# Patient Record
Sex: MALE | Race: WHITE
[De-identification: names, ages, dates, MRNs, and addresses within clinical notes are randomized per-mention and may not be internally consistent; named-entity substitution may affect disease eponyms.]

---

## 2018-01-29 ENCOUNTER — HOSPITAL ENCOUNTER (OUTPATIENT)
Age: 58
End: 2018-01-29
Payer: COMMERCIAL

## 2018-01-29 DIAGNOSIS — M79.672: ICD-10-CM

## 2018-01-29 DIAGNOSIS — M76.62: Primary | ICD-10-CM

## 2018-01-29 PROCEDURE — 73610 X-RAY EXAM OF ANKLE: CPT

## 2018-01-29 PROCEDURE — 73630 X-RAY EXAM OF FOOT: CPT

## 2018-02-23 ENCOUNTER — HOSPITAL ENCOUNTER (OUTPATIENT)
Age: 58
End: 2018-02-23
Payer: COMMERCIAL

## 2018-02-23 DIAGNOSIS — Z00.00: Primary | ICD-10-CM

## 2018-03-21 ENCOUNTER — HOSPITAL ENCOUNTER (OUTPATIENT)
Dept: HOSPITAL 22 - PT | Age: 58
LOS: 19 days | Discharge: HOME | End: 2018-04-09
Payer: COMMERCIAL

## 2018-03-21 DIAGNOSIS — M79.672: Primary | ICD-10-CM

## 2018-03-21 DIAGNOSIS — M76.62: ICD-10-CM

## 2018-03-21 PROCEDURE — 97110 THERAPEUTIC EXERCISES: CPT

## 2018-03-21 PROCEDURE — 97033 APP MDLTY 1+IONTPHRSIS EA 15: CPT

## 2018-07-16 ENCOUNTER — HOSPITAL ENCOUNTER (OUTPATIENT)
Age: 58
End: 2018-07-16
Payer: COMMERCIAL

## 2018-07-16 DIAGNOSIS — M79.671: Primary | ICD-10-CM

## 2018-07-16 DIAGNOSIS — G57.61: ICD-10-CM

## 2018-07-16 PROCEDURE — 76882 US LMTD JT/FCL EVL NVASC XTR: CPT

## 2018-11-21 ENCOUNTER — HOSPITAL ENCOUNTER (OUTPATIENT)
Age: 58
End: 2018-11-21
Payer: COMMERCIAL

## 2018-11-21 DIAGNOSIS — I10: ICD-10-CM

## 2018-11-21 DIAGNOSIS — E78.1: ICD-10-CM

## 2018-11-21 DIAGNOSIS — E11.9: Primary | ICD-10-CM

## 2018-11-21 LAB
ALBUMIN LEVEL: 4 GM/DL (ref 3.4–5)
ALBUMIN/GLOB SERPL: 1.4 {RATIO} (ref 1.1–1.8)
ALP ISO SERPL-ACNC: 83 U/L (ref 46–116)
ALT SERPLBLD-CCNC: 46 U/L (ref 12–78)
ANION GAP SERPL CALC-SCNC: 14 MEQ/L (ref 5–15)
AST SERPL QL: 29 U/L (ref 15–37)
BILIRUBIN,TOTAL: 0.7 MG/DL (ref 0.2–1)
BUN SERPL-MCNC: 18 MG/DL (ref 7–18)
CALCIUM SPEC-MCNC: 8.7 MG/DL (ref 8.5–10.1)
CHLORIDE SPEC-SCNC: 105 MMOL/L (ref 98–107)
CHOLEST SPEC-SCNC: 127 MG/DL (ref 140–200)
CO2 SERPL-SCNC: 27 MMOL/L (ref 21–32)
CREAT BLD-SCNC: 1.38 MG/DL (ref 0.7–1.3)
ESTIMATED GLOMERULAR FILT RATE: 53 ML/MIN (ref 60–?)
GFR (AFRICAN AMERICAN): 64 ML/MIN (ref 60–?)
GLOBULIN SER CALC-MCNC: 2.9 GM/DL (ref 1.3–3.2)
GLUCOSE: 188 MG/DL (ref 74–106)
HBA1C MFR BLD: 6.9 % (ref 0–7)
HCT VFR BLD CALC: 46 % (ref 42–52)
HDLC SERPL-MCNC: 32 MG/DL (ref 27–67)
HGB BLD-MCNC: 15.7 G/DL (ref 14.1–18)
MCHC RBC-ENTMCNC: 34.2 G/DL (ref 31.8–35.4)
MCV RBC: 92.9 FL (ref 80–94)
MEAN CORPUSCULAR HEMOGLOBIN: 31.8 PG (ref 27–31.2)
PLATELET # BLD: 107 K/MM3 (ref 142–424)
POTASSIUM: 4 MMOL/L (ref 3.5–5.1)
PROT SERPL-MCNC: 6.9 GM/DL (ref 6.4–8.2)
RBC # BLD AUTO: 4.95 M/MM3 (ref 4.6–6.2)
SODIUM SPEC-SCNC: 142 MMOL/L (ref 136–145)
TRIGLYCERIDES: 149 MG/DL (ref 30–200)
WBC # BLD AUTO: 2.8 K/MM3 (ref 4.8–10.8)

## 2018-11-21 PROCEDURE — 85025 COMPLETE CBC W/AUTO DIFF WBC: CPT

## 2018-11-21 PROCEDURE — 80053 COMPREHEN METABOLIC PANEL: CPT

## 2018-11-21 PROCEDURE — 83036 HEMOGLOBIN GLYCOSYLATED A1C: CPT

## 2018-11-21 PROCEDURE — 36415 COLL VENOUS BLD VENIPUNCTURE: CPT

## 2018-11-21 PROCEDURE — 80061 LIPID PANEL: CPT

## 2018-12-07 ENCOUNTER — HOSPITAL ENCOUNTER (OUTPATIENT)
Age: 58
End: 2018-12-07
Payer: COMMERCIAL

## 2018-12-07 DIAGNOSIS — N17.9: Primary | ICD-10-CM

## 2018-12-07 DIAGNOSIS — D75.89: ICD-10-CM

## 2018-12-07 LAB
ANION GAP SERPL CALC-SCNC: 17.2 MEQ/L (ref 5–15)
BUN SERPL-MCNC: 14 MG/DL (ref 7–18)
CALCIUM SPEC-MCNC: 8.7 MG/DL (ref 8.5–10.1)
CHLORIDE SPEC-SCNC: 104 MMOL/L (ref 98–107)
CO2 SERPL-SCNC: 26 MMOL/L (ref 21–32)
COLOR UR: YELLOW
CREAT BLD-SCNC: 1.45 MG/DL (ref 0.7–1.3)
ESTIMATED GLOMERULAR FILT RATE: 50 ML/MIN (ref 60–?)
GFR (AFRICAN AMERICAN): 61 ML/MIN (ref 60–?)
GLUCOSE: 177 MG/DL (ref 74–106)
HCT VFR BLD CALC: 47 % (ref 42–52)
HGB BLD-MCNC: 16.3 G/DL (ref 14.1–18)
MCHC RBC-ENTMCNC: 34.6 G/DL (ref 31.8–35.4)
MCV RBC: 90.8 FL (ref 80–94)
MEAN CORPUSCULAR HEMOGLOBIN: 31.4 PG (ref 27–31.2)
MICRO URNS: (no result)
PH UR: 6 [PH] (ref 5–8.5)
PLATELET # BLD: 110 K/MM3 (ref 142–424)
POTASSIUM: 4.2 MMOL/L (ref 3.5–5.1)
RBC # BLD AUTO: 5.18 M/MM3 (ref 4.6–6.2)
SODIUM SPEC-SCNC: 143 MMOL/L (ref 136–145)
SP GR UR: 1.01 (ref 1–1.03)
UROBILINOGEN UR QL: 0.2 EU/DL
WBC # BLD AUTO: 4.3 K/MM3 (ref 4.8–10.8)

## 2018-12-07 PROCEDURE — 80048 BASIC METABOLIC PNL TOTAL CA: CPT

## 2018-12-07 PROCEDURE — 81001 URINALYSIS AUTO W/SCOPE: CPT

## 2018-12-07 PROCEDURE — 85025 COMPLETE CBC W/AUTO DIFF WBC: CPT

## 2018-12-07 PROCEDURE — 36415 COLL VENOUS BLD VENIPUNCTURE: CPT

## 2018-12-07 PROCEDURE — 87205 SMEAR GRAM STAIN: CPT

## 2018-12-07 PROCEDURE — 76770 US EXAM ABDO BACK WALL COMP: CPT

## 2018-12-20 ENCOUNTER — OFFICE VISIT (OUTPATIENT)
Dept: BARIATRICS/WEIGHT MGMT | Facility: CLINIC | Age: 58
End: 2018-12-20

## 2018-12-20 ENCOUNTER — DOCUMENTATION (OUTPATIENT)
Dept: BARIATRICS/WEIGHT MGMT | Facility: CLINIC | Age: 58
End: 2018-12-20

## 2018-12-20 VITALS
HEART RATE: 74 BPM | DIASTOLIC BLOOD PRESSURE: 82 MMHG | OXYGEN SATURATION: 99 % | TEMPERATURE: 97.8 F | BODY MASS INDEX: 41.75 KG/M2 | HEIGHT: 73 IN | WEIGHT: 315 LBS | RESPIRATION RATE: 18 BRPM | SYSTOLIC BLOOD PRESSURE: 128 MMHG

## 2018-12-20 DIAGNOSIS — R10.13 DYSPEPSIA: ICD-10-CM

## 2018-12-20 DIAGNOSIS — G47.33 OBSTRUCTIVE SLEEP APNEA SYNDROME: Primary | ICD-10-CM

## 2018-12-20 DIAGNOSIS — E11.69 DIABETES MELLITUS TYPE 2 IN OBESE (HCC): ICD-10-CM

## 2018-12-20 DIAGNOSIS — I10 ESSENTIAL HYPERTENSION: ICD-10-CM

## 2018-12-20 DIAGNOSIS — E66.9 DIABETES MELLITUS TYPE 2 IN OBESE (HCC): ICD-10-CM

## 2018-12-20 DIAGNOSIS — R53.83 FATIGUE, UNSPECIFIED TYPE: ICD-10-CM

## 2018-12-20 PROCEDURE — 99204 OFFICE O/P NEW MOD 45 MIN: CPT | Performed by: PHYSICIAN ASSISTANT

## 2018-12-20 RX ORDER — RIVAROXABAN 20 MG/1
20 TABLET, FILM COATED ORAL DAILY
COMMUNITY
Start: 2018-11-26

## 2018-12-20 RX ORDER — GLIMEPIRIDE 4 MG/1
TABLET ORAL
COMMUNITY
Start: 2018-12-03 | End: 2021-01-25

## 2018-12-20 RX ORDER — DOFETILIDE 0.25 MG/1
250 CAPSULE ORAL 2 TIMES DAILY
Refills: 6 | COMMUNITY
Start: 2018-12-11

## 2018-12-20 RX ORDER — FENOFIBRATE 145 MG/1
TABLET, COATED ORAL
COMMUNITY
Start: 2018-12-03 | End: 2022-03-24

## 2018-12-20 RX ORDER — METFORMIN HYDROCHLORIDE 500 MG/1
1500 TABLET, EXTENDED RELEASE ORAL
COMMUNITY
Start: 2018-12-19 | End: 2021-11-17 | Stop reason: SDUPTHER

## 2018-12-20 RX ORDER — DULAGLUTIDE 1.5 MG/.5ML
INJECTION, SOLUTION SUBCUTANEOUS
COMMUNITY
Start: 2018-11-29 | End: 2020-12-23 | Stop reason: SDUPTHER

## 2018-12-20 RX ORDER — PEN NEEDLE, DIABETIC 32GX 5/32"
NEEDLE, DISPOSABLE MISCELLANEOUS
Refills: 11 | COMMUNITY
Start: 2018-11-29 | End: 2022-05-18 | Stop reason: SDUPTHER

## 2018-12-20 RX ORDER — INFLUENZA A VIRUS A/SINGAPORE/GP1908/2015 IVR-180A (H1N1) ANTIGEN (PROPIOLACTONE INACTIVATED), INFLUENZA A VIRUS A/SINGAPORE/INFIMH-16-0019/2016 IVR-186 (H3N2) ANTIGEN (PROPIOLACTONE INACTIVATED), INFLUENZA B VIRUS B/MARYLAND/15/2016 ANTIGEN (PROPIOLACTONE INACTIVATED), AND INFLUENZA B VIRUS B/PHUKET/3073/2013 BVR-1B ANTIGEN (PROPIOLACTONE INACTIVATED) 15; 15; 15; 15 UG/.5ML; UG/.5ML; UG/.5ML; UG/.5ML
INJECTION, SUSPENSION INTRAMUSCULAR
Refills: 0 | COMMUNITY
Start: 2018-10-08 | End: 2018-12-20

## 2018-12-20 RX ORDER — FUROSEMIDE 40 MG/1
40 TABLET ORAL 3 TIMES WEEKLY
COMMUNITY
Start: 2018-12-19

## 2018-12-20 RX ORDER — POTASSIUM CHLORIDE 1500 MG/1
20 TABLET, EXTENDED RELEASE ORAL DAILY
COMMUNITY
Start: 2018-12-19

## 2018-12-20 RX ORDER — DOFETILIDE 0.12 MG/1
125 CAPSULE ORAL 2 TIMES DAILY
Refills: 3 | COMMUNITY
Start: 2018-11-29

## 2018-12-20 RX ORDER — METOPROLOL SUCCINATE 100 MG/1
50 TABLET, EXTENDED RELEASE ORAL 2 TIMES DAILY
Refills: 0 | COMMUNITY
Start: 2018-11-01

## 2018-12-20 RX ORDER — MAGNESIUM OXIDE 400 MG/1
1 TABLET ORAL DAILY
Refills: 9 | COMMUNITY
Start: 2018-11-24

## 2018-12-20 RX ORDER — INSULIN DEGLUDEC 200 U/ML
INJECTION, SOLUTION SUBCUTANEOUS
Refills: 2 | COMMUNITY
Start: 2018-12-11 | End: 2021-01-25

## 2018-12-20 RX ORDER — OMEPRAZOLE 40 MG/1
40 CAPSULE, DELAYED RELEASE ORAL DAILY
COMMUNITY

## 2019-01-02 ENCOUNTER — TELEPHONE (OUTPATIENT)
Dept: BARIATRICS/WEIGHT MGMT | Facility: CLINIC | Age: 59
End: 2019-01-02

## 2019-01-20 ENCOUNTER — RESULTS ENCOUNTER (OUTPATIENT)
Dept: BARIATRICS/WEIGHT MGMT | Facility: CLINIC | Age: 59
End: 2019-01-20

## 2019-01-20 DIAGNOSIS — R10.13 DYSPEPSIA: ICD-10-CM

## 2019-03-26 ENCOUNTER — HOSPITAL ENCOUNTER (OUTPATIENT)
Age: 59
End: 2019-03-26
Payer: COMMERCIAL

## 2019-03-26 DIAGNOSIS — D75.89: ICD-10-CM

## 2019-03-26 DIAGNOSIS — I48.2: ICD-10-CM

## 2019-03-26 DIAGNOSIS — R16.1: ICD-10-CM

## 2019-03-26 DIAGNOSIS — Z79.84: ICD-10-CM

## 2019-03-26 DIAGNOSIS — E78.1: ICD-10-CM

## 2019-03-26 DIAGNOSIS — K76.0: Primary | ICD-10-CM

## 2019-03-26 DIAGNOSIS — E11.69: ICD-10-CM

## 2019-03-26 LAB
ALBUMIN LEVEL: 4 GM/DL (ref 3.4–5)
ALBUMIN/GLOB SERPL: 1.3 {RATIO} (ref 1.1–1.8)
ALP ISO SERPL-ACNC: 105 U/L (ref 46–116)
ALT SERPLBLD-CCNC: 51 U/L (ref 12–78)
ANION GAP SERPL CALC-SCNC: 15.7 MEQ/L (ref 5–15)
AST SERPL QL: 35 U/L (ref 15–37)
BILIRUBIN,TOTAL: 0.5 MG/DL (ref 0.2–1)
BUN SERPL-MCNC: 21 MG/DL (ref 7–18)
CALCIUM SPEC-MCNC: 9 MG/DL (ref 8.5–10.1)
CHLORIDE SPEC-SCNC: 103 MMOL/L (ref 98–107)
CHOLEST SPEC-SCNC: 147 MG/DL (ref 140–200)
CO2 SERPL-SCNC: 26 MMOL/L (ref 21–32)
CREAT BLD-SCNC: 1.38 MG/DL (ref 0.7–1.3)
ESTIMATED GLOMERULAR FILT RATE: 53 ML/MIN (ref 60–?)
GFR (AFRICAN AMERICAN): 64 ML/MIN (ref 60–?)
GLOBULIN SER CALC-MCNC: 3 GM/DL (ref 1.3–3.2)
GLUCOSE: 216 MG/DL (ref 74–106)
HBA1C MFR BLD: 7.8 % (ref 0–7)
HCT VFR BLD CALC: 44.3 % (ref 42–52)
HDLC SERPL-MCNC: 25 MG/DL (ref 27–67)
HGB BLD-MCNC: 15.9 G/DL (ref 14.1–18)
MCHC RBC-ENTMCNC: 35.8 G/DL (ref 31.8–35.4)
MCV RBC: 90.2 FL (ref 80–94)
MEAN CORPUSCULAR HEMOGLOBIN: 32.2 PG (ref 27–31.2)
PLATELET # BLD: 114 K/MM3 (ref 142–424)
POTASSIUM: 3.7 MMOL/L (ref 3.5–5.1)
PROT SERPL-MCNC: 7 GM/DL (ref 6.4–8.2)
RBC # BLD AUTO: 4.92 M/MM3 (ref 4.6–6.2)
SODIUM SPEC-SCNC: 141 MMOL/L (ref 136–145)
TRIGLYCERIDES: 377 MG/DL (ref 30–200)
TSH SERPL-ACNC: 1.73 UIU/ML (ref 0.36–3.74)
WBC # BLD AUTO: 3.5 K/MM3 (ref 4.8–10.8)

## 2019-03-26 PROCEDURE — 80053 COMPREHEN METABOLIC PANEL: CPT

## 2019-03-26 PROCEDURE — 84443 ASSAY THYROID STIM HORMONE: CPT

## 2019-03-26 PROCEDURE — 85025 COMPLETE CBC W/AUTO DIFF WBC: CPT

## 2019-03-26 PROCEDURE — 80061 LIPID PANEL: CPT

## 2019-03-26 PROCEDURE — 36415 COLL VENOUS BLD VENIPUNCTURE: CPT

## 2019-03-26 PROCEDURE — 83036 HEMOGLOBIN GLYCOSYLATED A1C: CPT

## 2019-04-02 ENCOUNTER — HOSPITAL ENCOUNTER (OUTPATIENT)
Age: 59
End: 2019-04-02
Payer: COMMERCIAL

## 2019-04-02 DIAGNOSIS — R16.1: Primary | ICD-10-CM

## 2019-04-02 PROCEDURE — 76700 US EXAM ABDOM COMPLETE: CPT

## 2019-04-19 ENCOUNTER — ON CAMPUS - OUTPATIENT (OUTPATIENT)
Dept: RURAL HOSPITAL 6 | Facility: HOSPITAL | Age: 59
End: 2019-04-19
Payer: COMMERCIAL

## 2019-04-19 DIAGNOSIS — K57.90 DIVERTICULOSIS OF INTESTINE, PART UNSPECIFIED, WITHOUT PERFO: ICD-10-CM

## 2019-04-19 DIAGNOSIS — K21.0 GASTRO-ESOPHAGEAL REFLUX DISEASE WITH ESOPHAGITIS: ICD-10-CM

## 2019-04-19 DIAGNOSIS — D12.2 BENIGN NEOPLASM OF ASCENDING COLON: ICD-10-CM

## 2019-04-19 DIAGNOSIS — K64.1 SECOND DEGREE HEMORRHOIDS: ICD-10-CM

## 2019-04-19 DIAGNOSIS — K29.50 UNSPECIFIED CHRONIC GASTRITIS WITHOUT BLEEDING: ICD-10-CM

## 2019-04-19 DIAGNOSIS — D12.3 BENIGN NEOPLASM OF TRANSVERSE COLON: ICD-10-CM

## 2019-04-19 DIAGNOSIS — K22.70 BARRETT'S ESOPHAGUS WITHOUT DYSPLASIA: ICD-10-CM

## 2019-04-19 DIAGNOSIS — Z86.010 PERSONAL HISTORY OF COLONIC POLYPS: ICD-10-CM

## 2019-04-19 PROCEDURE — 45385 COLONOSCOPY W/LESION REMOVAL: CPT | Mod: 33 | Performed by: INTERNAL MEDICINE

## 2019-04-19 PROCEDURE — 43239 EGD BIOPSY SINGLE/MULTIPLE: CPT | Mod: 59 | Performed by: INTERNAL MEDICINE

## 2019-05-15 ENCOUNTER — HOSPITAL ENCOUNTER (OUTPATIENT)
Age: 59
End: 2019-05-15
Payer: COMMERCIAL

## 2019-05-15 DIAGNOSIS — R16.1: Primary | ICD-10-CM

## 2019-05-15 LAB
BUN SERPL-MCNC: 12 MG/DL (ref 7–18)
CREAT BLD-SCNC: 1.21 MG/DL (ref 0.7–1.3)
ESTIMATED GLOMERULAR FILT RATE: 62 ML/MIN (ref 60–?)
GFR (AFRICAN AMERICAN): 75 ML/MIN (ref 60–?)

## 2019-05-15 PROCEDURE — 82565 ASSAY OF CREATININE: CPT

## 2019-05-15 PROCEDURE — 84520 ASSAY OF UREA NITROGEN: CPT

## 2019-05-15 PROCEDURE — 74160 CT ABDOMEN W/CONTRAST: CPT

## 2019-05-15 PROCEDURE — 36415 COLL VENOUS BLD VENIPUNCTURE: CPT

## 2019-05-16 ENCOUNTER — HOSPITAL ENCOUNTER (OUTPATIENT)
Age: 59
End: 2019-05-16
Payer: COMMERCIAL

## 2019-05-16 DIAGNOSIS — D69.6: ICD-10-CM

## 2019-05-16 DIAGNOSIS — R16.1: Primary | ICD-10-CM

## 2019-05-16 DIAGNOSIS — D72.819: ICD-10-CM

## 2019-05-16 PROCEDURE — 84155 ASSAY OF PROTEIN SERUM: CPT

## 2019-05-16 PROCEDURE — 82784 ASSAY IGA/IGD/IGG/IGM EACH: CPT

## 2019-05-16 PROCEDURE — 86334 IMMUNOFIX E-PHORESIS SERUM: CPT

## 2019-05-16 PROCEDURE — 84165 PROTEIN E-PHORESIS SERUM: CPT

## 2019-05-16 PROCEDURE — 36415 COLL VENOUS BLD VENIPUNCTURE: CPT

## 2019-05-16 PROCEDURE — 83883 ASSAY NEPHELOMETRY NOT SPEC: CPT

## 2019-05-20 LAB
ALBUMIN SERPL ELPH-MCNC: 4.1 G/DL (ref 2.9–4.4)
ALPHA1 GLOB SERPL ELPH-MCNC: 0.3 G/DL (ref 0–0.4)
ALPHA2 GLOB SERPL ELPH-MCNC: 0.8 G/DL (ref 0.4–1)
GAMMA GLOBULIN: 0.9 G/DL (ref 0.4–1.8)
IGA SERPL-MCNC: 57 MG/DL (ref 90–386)
IGG SERPL-MCNC: 581 MG/DL (ref 700–1600)

## 2019-05-21 ENCOUNTER — HOSPITAL ENCOUNTER (OUTPATIENT)
Age: 59
End: 2019-05-21
Payer: COMMERCIAL

## 2019-05-21 DIAGNOSIS — C90.00: Primary | ICD-10-CM

## 2019-05-21 LAB — IGM SERPL-MCNC: 382 MG/DL (ref 20–172)

## 2019-05-21 PROCEDURE — 77075 RADEX OSSEOUS SURVEY COMPL: CPT

## 2019-05-24 ENCOUNTER — HOSPITAL ENCOUNTER (OUTPATIENT)
Age: 59
End: 2019-05-24
Payer: COMMERCIAL

## 2019-05-24 DIAGNOSIS — C90.00: Primary | ICD-10-CM

## 2019-05-24 PROCEDURE — 70470 CT HEAD/BRAIN W/O & W/DYE: CPT

## 2019-05-30 ENCOUNTER — HOSPITAL ENCOUNTER (OUTPATIENT)
Dept: HOSPITAL 22 - INF | Age: 59
Discharge: HOME | End: 2019-05-30
Payer: COMMERCIAL

## 2019-05-30 VITALS
SYSTOLIC BLOOD PRESSURE: 128 MMHG | OXYGEN SATURATION: 98 % | HEART RATE: 76 BPM | RESPIRATION RATE: 18 BRPM | TEMPERATURE: 97.9 F | DIASTOLIC BLOOD PRESSURE: 72 MMHG

## 2019-05-30 VITALS — BODY MASS INDEX: 44.9 KG/M2

## 2019-05-30 DIAGNOSIS — C90.00: Primary | ICD-10-CM

## 2019-05-30 LAB
HCT VFR BLD CALC: 46.4 % (ref 42–52)
HGB BLD-MCNC: 17 G/DL (ref 14.1–18)
MCHC RBC-ENTMCNC: 36.6 G/DL (ref 31.8–35.4)
MCV RBC: 86.5 FL (ref 80–94)
MEAN CORPUSCULAR HEMOGLOBIN: 31.7 PG (ref 27–31.2)
PLATELET # BLD: 100 K/MM3 (ref 142–424)
RBC # BLD AUTO: 5.37 M/MM3 (ref 4.6–6.2)
WBC # BLD AUTO: 4 K/MM3 (ref 4.8–10.8)

## 2019-05-30 PROCEDURE — 36415 COLL VENOUS BLD VENIPUNCTURE: CPT

## 2019-05-30 PROCEDURE — 96409 CHEMO IV PUSH SNGL DRUG: CPT

## 2019-05-30 PROCEDURE — 85025 COMPLETE CBC W/AUTO DIFF WBC: CPT

## 2019-06-03 ENCOUNTER — HOSPITAL ENCOUNTER (OUTPATIENT)
Dept: HOSPITAL 22 - INF | Age: 59
Discharge: HOME | End: 2019-06-03
Payer: COMMERCIAL

## 2019-06-03 VITALS
HEART RATE: 92 BPM | DIASTOLIC BLOOD PRESSURE: 78 MMHG | TEMPERATURE: 98.5 F | SYSTOLIC BLOOD PRESSURE: 123 MMHG | RESPIRATION RATE: 20 BRPM | OXYGEN SATURATION: 95 %

## 2019-06-03 DIAGNOSIS — C90.00: Primary | ICD-10-CM

## 2019-06-03 PROCEDURE — 96401 CHEMO ANTI-NEOPL SQ/IM: CPT

## 2019-06-05 ENCOUNTER — HOSPITAL ENCOUNTER (OUTPATIENT)
Age: 59
End: 2019-06-05
Payer: COMMERCIAL

## 2019-06-05 DIAGNOSIS — C90.00: Primary | ICD-10-CM

## 2019-06-05 LAB
ALBUMIN LEVEL: 3.8 GM/DL (ref 3.4–5)
ALBUMIN/GLOB SERPL: 1.4 {RATIO} (ref 1.1–1.8)
ALP ISO SERPL-ACNC: 71 U/L (ref 46–116)
ALT SERPLBLD-CCNC: 55 U/L (ref 12–78)
ANION GAP SERPL CALC-SCNC: 17.6 MEQ/L (ref 5–15)
AST SERPL QL: 30 U/L (ref 15–37)
BILIRUBIN,TOTAL: 0.9 MG/DL (ref 0.2–1)
BUN SERPL-MCNC: 12 MG/DL (ref 7–18)
CALCIUM SPEC-MCNC: 8.6 MG/DL (ref 8.5–10.1)
CHLORIDE SPEC-SCNC: 105 MMOL/L (ref 98–107)
CO2 SERPL-SCNC: 23 MMOL/L (ref 21–32)
CREAT BLD-SCNC: 1.17 MG/DL (ref 0.7–1.3)
ESTIMATED GLOMERULAR FILT RATE: 64 ML/MIN (ref 60–?)
GFR (AFRICAN AMERICAN): 77 ML/MIN (ref 60–?)
GLOBULIN SER CALC-MCNC: 2.7 GM/DL (ref 1.3–3.2)
GLUCOSE: 119 MG/DL (ref 74–106)
HCT VFR BLD CALC: 46.3 % (ref 42–52)
HGB BLD-MCNC: 15.9 G/DL (ref 14.1–18)
MCHC RBC-ENTMCNC: 34.3 G/DL (ref 31.8–35.4)
MCV RBC: 93.7 FL (ref 80–94)
MEAN CORPUSCULAR HEMOGLOBIN: 32.1 PG (ref 27–31.2)
PLATELET # BLD: 106 K/MM3 (ref 142–424)
POTASSIUM: 3.6 MMOL/L (ref 3.5–5.1)
PROT SERPL-MCNC: 6.5 GM/DL (ref 6.4–8.2)
RBC # BLD AUTO: 4.94 M/MM3 (ref 4.6–6.2)
SODIUM SPEC-SCNC: 142 MMOL/L (ref 136–145)
WBC # BLD AUTO: 3.4 K/MM3 (ref 4.8–10.8)

## 2019-06-05 PROCEDURE — 36415 COLL VENOUS BLD VENIPUNCTURE: CPT

## 2019-06-05 PROCEDURE — 85025 COMPLETE CBC W/AUTO DIFF WBC: CPT

## 2019-06-05 PROCEDURE — 80053 COMPREHEN METABOLIC PANEL: CPT

## 2019-06-06 ENCOUNTER — HOSPITAL ENCOUNTER (OUTPATIENT)
Dept: HOSPITAL 22 - INF | Age: 59
Discharge: HOME | End: 2019-06-06
Payer: COMMERCIAL

## 2019-06-06 VITALS
DIASTOLIC BLOOD PRESSURE: 75 MMHG | SYSTOLIC BLOOD PRESSURE: 128 MMHG | OXYGEN SATURATION: 93 % | HEART RATE: 96 BPM | RESPIRATION RATE: 18 BRPM | TEMPERATURE: 97 F

## 2019-06-06 DIAGNOSIS — C90.00: Primary | ICD-10-CM

## 2019-06-06 PROCEDURE — 96401 CHEMO ANTI-NEOPL SQ/IM: CPT

## 2019-06-10 ENCOUNTER — HOSPITAL ENCOUNTER (OUTPATIENT)
Dept: HOSPITAL 22 - INF | Age: 59
Discharge: HOME | End: 2019-06-10
Payer: COMMERCIAL

## 2019-06-10 VITALS
HEART RATE: 88 BPM | OXYGEN SATURATION: 96 % | DIASTOLIC BLOOD PRESSURE: 79 MMHG | RESPIRATION RATE: 18 BRPM | SYSTOLIC BLOOD PRESSURE: 142 MMHG | TEMPERATURE: 97.9 F

## 2019-06-10 DIAGNOSIS — C90.00: Primary | ICD-10-CM

## 2019-06-10 PROCEDURE — 96401 CHEMO ANTI-NEOPL SQ/IM: CPT

## 2019-06-17 ENCOUNTER — HOSPITAL ENCOUNTER (OUTPATIENT)
Age: 59
End: 2019-06-17
Payer: COMMERCIAL

## 2019-06-17 DIAGNOSIS — C90.00: Primary | ICD-10-CM

## 2019-06-17 LAB
ALBUMIN LEVEL: 3.5 GM/DL (ref 3.4–5)
ALBUMIN/GLOB SERPL: 1.3 {RATIO} (ref 1.1–1.8)
ALP ISO SERPL-ACNC: 94 U/L (ref 46–116)
ALT SERPLBLD-CCNC: 54 U/L (ref 12–78)
ANION GAP SERPL CALC-SCNC: 15.6 MEQ/L (ref 5–15)
AST SERPL QL: 34 U/L (ref 15–37)
BILIRUBIN,TOTAL: 0.7 MG/DL (ref 0.2–1)
BUN SERPL-MCNC: 13 MG/DL (ref 7–18)
CALCIUM SPEC-MCNC: 8.2 MG/DL (ref 8.5–10.1)
CHLORIDE SPEC-SCNC: 107 MMOL/L (ref 98–107)
CO2 SERPL-SCNC: 23 MMOL/L (ref 21–32)
CREAT BLD-SCNC: 1.16 MG/DL (ref 0.7–1.3)
ESTIMATED GLOMERULAR FILT RATE: 65 ML/MIN (ref 60–?)
GFR (AFRICAN AMERICAN): 78 ML/MIN (ref 60–?)
GLOBULIN SER CALC-MCNC: 2.8 GM/DL (ref 1.3–3.2)
GLUCOSE: 127 MG/DL (ref 74–106)
HCT VFR BLD CALC: 42.7 % (ref 42–52)
HGB BLD-MCNC: 14.3 G/DL (ref 14.1–18)
MCHC RBC-ENTMCNC: 33.6 G/DL (ref 31.8–35.4)
MCV RBC: 88.1 FL (ref 80–94)
MEAN CORPUSCULAR HEMOGLOBIN: 29.6 PG (ref 27–31.2)
PLATELET # BLD: 61 K/MM3 (ref 142–424)
POTASSIUM: 3.6 MMOL/L (ref 3.5–5.1)
PROT SERPL-MCNC: 6.3 GM/DL (ref 6.4–8.2)
RBC # BLD AUTO: 4.85 M/MM3 (ref 4.6–6.2)
SODIUM SPEC-SCNC: 142 MMOL/L (ref 136–145)
WBC # BLD AUTO: 2 K/MM3 (ref 4.8–10.8)

## 2019-06-17 PROCEDURE — 86334 IMMUNOFIX E-PHORESIS SERUM: CPT

## 2019-06-17 PROCEDURE — 80053 COMPREHEN METABOLIC PANEL: CPT

## 2019-06-17 PROCEDURE — 84165 PROTEIN E-PHORESIS SERUM: CPT

## 2019-06-17 PROCEDURE — 83883 ASSAY NEPHELOMETRY NOT SPEC: CPT

## 2019-06-17 PROCEDURE — 85025 COMPLETE CBC W/AUTO DIFF WBC: CPT

## 2019-06-17 PROCEDURE — 36415 COLL VENOUS BLD VENIPUNCTURE: CPT

## 2019-06-17 PROCEDURE — 82784 ASSAY IGA/IGD/IGG/IGM EACH: CPT

## 2019-06-17 PROCEDURE — 84155 ASSAY OF PROTEIN SERUM: CPT

## 2019-06-18 LAB
ALBUMIN SERPL ELPH-MCNC: 3.7 G/DL (ref 2.9–4.4)
ALPHA1 GLOB SERPL ELPH-MCNC: 0.2 G/DL (ref 0–0.4)
ALPHA2 GLOB SERPL ELPH-MCNC: 0.6 G/DL (ref 0.4–1)
GAMMA GLOBULIN: 0.9 G/DL (ref 0.4–1.8)
IGA SERPL-MCNC: 59 MG/DL (ref 90–386)
IGG SERPL-MCNC: 610 MG/DL (ref 700–1600)
IGM SERPL-MCNC: 376 MG/DL (ref 20–172)

## 2019-06-27 ENCOUNTER — HOSPITAL ENCOUNTER (OUTPATIENT)
Dept: HOSPITAL 22 - LAB | Age: 59
Discharge: HOME | End: 2019-06-27
Payer: COMMERCIAL

## 2019-06-27 VITALS
HEART RATE: 76 BPM | TEMPERATURE: 98 F | RESPIRATION RATE: 20 BRPM | OXYGEN SATURATION: 97 % | DIASTOLIC BLOOD PRESSURE: 82 MMHG | SYSTOLIC BLOOD PRESSURE: 127 MMHG

## 2019-06-27 DIAGNOSIS — C90.00: Primary | ICD-10-CM

## 2019-06-27 LAB
ALBUMIN LEVEL: 3.9 GM/DL (ref 3.4–5)
ALBUMIN/GLOB SERPL: 1.3 {RATIO} (ref 1.1–1.8)
ALP ISO SERPL-ACNC: 95 U/L (ref 46–116)
ALT SERPLBLD-CCNC: 41 U/L (ref 12–78)
ANION GAP SERPL CALC-SCNC: 17.2 MEQ/L (ref 5–15)
AST SERPL QL: 32 U/L (ref 15–37)
BILIRUBIN,TOTAL: 0.8 MG/DL (ref 0.2–1)
BUN SERPL-MCNC: 16 MG/DL (ref 7–18)
CALCIUM SPEC-MCNC: 8.8 MG/DL (ref 8.5–10.1)
CHLORIDE SPEC-SCNC: 104 MMOL/L (ref 98–107)
CO2 SERPL-SCNC: 24 MMOL/L (ref 21–32)
CREAT BLD-SCNC: 1.23 MG/DL (ref 0.7–1.3)
ESTIMATED GLOMERULAR FILT RATE: 60 ML/MIN (ref 60–?)
GFR (AFRICAN AMERICAN): 73 ML/MIN (ref 60–?)
GLOBULIN SER CALC-MCNC: 3 GM/DL (ref 1.3–3.2)
GLUCOSE: 140 MG/DL (ref 74–106)
HCT VFR BLD CALC: 47.2 % (ref 42–52)
HGB BLD-MCNC: 15.9 G/DL (ref 14.1–18)
MCHC RBC-ENTMCNC: 33.7 G/DL (ref 31.8–35.4)
MCV RBC: 93.2 FL (ref 80–94)
MEAN CORPUSCULAR HEMOGLOBIN: 31.4 PG (ref 27–31.2)
PLATELET # BLD: 132 K/MM3 (ref 142–424)
POTASSIUM: 4.2 MMOL/L (ref 3.5–5.1)
PROT SERPL-MCNC: 6.9 GM/DL (ref 6.4–8.2)
RBC # BLD AUTO: 5.07 M/MM3 (ref 4.6–6.2)
SODIUM SPEC-SCNC: 141 MMOL/L (ref 136–145)
WBC # BLD AUTO: 2.5 K/MM3 (ref 4.8–10.8)

## 2019-06-27 PROCEDURE — 80053 COMPREHEN METABOLIC PANEL: CPT

## 2019-06-27 PROCEDURE — 85025 COMPLETE CBC W/AUTO DIFF WBC: CPT

## 2019-06-27 PROCEDURE — 36415 COLL VENOUS BLD VENIPUNCTURE: CPT

## 2019-06-27 PROCEDURE — 96401 CHEMO ANTI-NEOPL SQ/IM: CPT

## 2019-07-01 ENCOUNTER — HOSPITAL ENCOUNTER (OUTPATIENT)
Dept: HOSPITAL 22 - INF | Age: 59
Discharge: HOME | End: 2019-07-01
Payer: COMMERCIAL

## 2019-07-01 VITALS
HEART RATE: 84 BPM | SYSTOLIC BLOOD PRESSURE: 124 MMHG | DIASTOLIC BLOOD PRESSURE: 65 MMHG | RESPIRATION RATE: 18 BRPM | TEMPERATURE: 97.7 F | OXYGEN SATURATION: 95 %

## 2019-07-01 DIAGNOSIS — C90.00: Primary | ICD-10-CM

## 2019-07-01 PROCEDURE — 96401 CHEMO ANTI-NEOPL SQ/IM: CPT

## 2019-07-05 ENCOUNTER — HOSPITAL ENCOUNTER (OUTPATIENT)
Dept: HOSPITAL 22 - INF | Age: 59
Discharge: HOME | End: 2019-07-05
Payer: COMMERCIAL

## 2019-07-05 VITALS
HEART RATE: 81 BPM | OXYGEN SATURATION: 94 % | RESPIRATION RATE: 18 BRPM | SYSTOLIC BLOOD PRESSURE: 118 MMHG | DIASTOLIC BLOOD PRESSURE: 74 MMHG

## 2019-07-05 DIAGNOSIS — C90.00: Primary | ICD-10-CM

## 2019-07-05 PROCEDURE — 96401 CHEMO ANTI-NEOPL SQ/IM: CPT

## 2019-07-09 ENCOUNTER — HOSPITAL ENCOUNTER (OUTPATIENT)
Dept: HOSPITAL 22 - INF | Age: 59
Discharge: HOME | End: 2019-07-09
Payer: COMMERCIAL

## 2019-07-09 VITALS
SYSTOLIC BLOOD PRESSURE: 122 MMHG | TEMPERATURE: 98.5 F | DIASTOLIC BLOOD PRESSURE: 74 MMHG | HEART RATE: 68 BPM | RESPIRATION RATE: 20 BRPM | OXYGEN SATURATION: 95 %

## 2019-07-09 DIAGNOSIS — Z51.11: ICD-10-CM

## 2019-07-09 DIAGNOSIS — C90.00: Primary | ICD-10-CM

## 2019-07-09 PROCEDURE — 96401 CHEMO ANTI-NEOPL SQ/IM: CPT

## 2019-07-15 ENCOUNTER — HOSPITAL ENCOUNTER (OUTPATIENT)
Age: 59
End: 2019-07-15
Payer: COMMERCIAL

## 2019-07-15 DIAGNOSIS — C90.00: Primary | ICD-10-CM

## 2019-07-15 LAB
ALBUMIN LEVEL: 3.3 GM/DL (ref 3.4–5)
ALBUMIN/GLOB SERPL: 1.2 {RATIO} (ref 1.1–1.8)
ALP ISO SERPL-ACNC: 126 U/L (ref 46–116)
ALT SERPLBLD-CCNC: 58 U/L (ref 12–78)
ANION GAP SERPL CALC-SCNC: 14.6 MEQ/L (ref 5–15)
AST SERPL QL: 42 U/L (ref 15–37)
BILIRUBIN,TOTAL: 0.5 MG/DL (ref 0.2–1)
BUN SERPL-MCNC: 15 MG/DL (ref 7–18)
CALCIUM SPEC-MCNC: 8.2 MG/DL (ref 8.5–10.1)
CHLORIDE SPEC-SCNC: 104 MMOL/L (ref 98–107)
CO2 SERPL-SCNC: 25 MMOL/L (ref 21–32)
CREAT BLD-SCNC: 1.25 MG/DL (ref 0.7–1.3)
ESTIMATED GLOMERULAR FILT RATE: 59 ML/MIN (ref 60–?)
GFR (AFRICAN AMERICAN): 72 ML/MIN (ref 60–?)
GLOBULIN SER CALC-MCNC: 2.8 GM/DL (ref 1.3–3.2)
GLUCOSE: 194 MG/DL (ref 74–106)
HCT VFR BLD CALC: 43.9 % (ref 42–52)
HGB BLD-MCNC: 14.2 G/DL (ref 14.1–18)
MCHC RBC-ENTMCNC: 32.4 G/DL (ref 31.8–35.4)
MCV RBC: 94.7 FL (ref 80–94)
MEAN CORPUSCULAR HEMOGLOBIN: 30.6 PG (ref 27–31.2)
PLATELET # BLD: 72 K/MM3 (ref 142–424)
POTASSIUM: 3.6 MMOL/L (ref 3.5–5.1)
PROT SERPL-MCNC: 6.1 GM/DL (ref 6.4–8.2)
RBC # BLD AUTO: 4.63 M/MM3 (ref 4.6–6.2)
SODIUM SPEC-SCNC: 140 MMOL/L (ref 136–145)
WBC # BLD AUTO: 2.2 K/MM3 (ref 4.8–10.8)

## 2019-07-15 PROCEDURE — 85025 COMPLETE CBC W/AUTO DIFF WBC: CPT

## 2019-07-15 PROCEDURE — 36415 COLL VENOUS BLD VENIPUNCTURE: CPT

## 2019-07-15 PROCEDURE — 80053 COMPREHEN METABOLIC PANEL: CPT

## 2019-07-23 ENCOUNTER — HOSPITAL ENCOUNTER (OUTPATIENT)
Age: 59
End: 2019-07-23
Payer: COMMERCIAL

## 2019-07-23 DIAGNOSIS — C90.00: Primary | ICD-10-CM

## 2019-07-23 LAB
HCT VFR BLD CALC: 40.8 % (ref 42–52)
HGB BLD-MCNC: 13.7 G/DL (ref 14.1–18)
MCHC RBC-ENTMCNC: 33.7 G/DL (ref 31.8–35.4)
MCV RBC: 87.4 FL (ref 80–94)
MEAN CORPUSCULAR HEMOGLOBIN: 29.4 PG (ref 27–31.2)
PLATELET # BLD: 119 K/MM3 (ref 142–424)
RBC # BLD AUTO: 4.67 M/MM3 (ref 4.6–6.2)
WBC # BLD AUTO: 1.7 K/MM3 (ref 4.8–10.8)

## 2019-07-23 PROCEDURE — 36415 COLL VENOUS BLD VENIPUNCTURE: CPT

## 2019-07-23 PROCEDURE — 84165 PROTEIN E-PHORESIS SERUM: CPT

## 2019-07-23 PROCEDURE — 84155 ASSAY OF PROTEIN SERUM: CPT

## 2019-07-23 PROCEDURE — 86334 IMMUNOFIX E-PHORESIS SERUM: CPT

## 2019-07-23 PROCEDURE — 85025 COMPLETE CBC W/AUTO DIFF WBC: CPT

## 2019-07-23 PROCEDURE — 82784 ASSAY IGA/IGD/IGG/IGM EACH: CPT

## 2019-07-23 PROCEDURE — 83883 ASSAY NEPHELOMETRY NOT SPEC: CPT

## 2019-07-24 LAB
ALBUMIN SERPL ELPH-MCNC: 3.7 G/DL (ref 2.9–4.4)
ALPHA1 GLOB SERPL ELPH-MCNC: 0.2 G/DL (ref 0–0.4)
ALPHA2 GLOB SERPL ELPH-MCNC: 0.6 G/DL (ref 0.4–1)
GAMMA GLOBULIN: 0.9 G/DL (ref 0.4–1.8)
IGA SERPL-MCNC: 69 MG/DL (ref 90–386)
IGG SERPL-MCNC: 604 MG/DL (ref 700–1600)
IGM SERPL-MCNC: 282 MG/DL (ref 20–172)

## 2020-01-22 ENCOUNTER — HOSPITAL ENCOUNTER (OUTPATIENT)
Age: 60
End: 2020-01-22
Payer: COMMERCIAL

## 2020-01-22 DIAGNOSIS — Z79.4: ICD-10-CM

## 2020-01-22 DIAGNOSIS — E78.1: ICD-10-CM

## 2020-01-22 DIAGNOSIS — E11.65: Primary | ICD-10-CM

## 2020-01-22 DIAGNOSIS — E66.9: ICD-10-CM

## 2020-01-22 PROCEDURE — 36415 COLL VENOUS BLD VENIPUNCTURE: CPT

## 2020-04-03 ENCOUNTER — HOSPITAL ENCOUNTER (OUTPATIENT)
Age: 60
End: 2020-04-03
Payer: COMMERCIAL

## 2020-04-03 DIAGNOSIS — C90.00: Primary | ICD-10-CM

## 2020-04-03 LAB
ALBUMIN LEVEL: 4.3 G/DL (ref 3.5–5)
ALBUMIN/GLOB SERPL: 1.8 {RATIO} (ref 1.1–1.8)
ALP ISO SERPL-ACNC: 101 U/L (ref 38–126)
ALT SERPLBLD-CCNC: 34 U/L (ref 12–78)
ANION GAP SERPL CALC-SCNC: 15.3 MEQ/L (ref 5–15)
AST SERPL QL: 46 U/L (ref 17–59)
BILIRUBIN,TOTAL: 0.6 MG/DL (ref 0.2–1.3)
BUN SERPL-MCNC: 16 MG/DL (ref 9–20)
CALCIUM SPEC-MCNC: 9.7 MG/DL (ref 8.4–10.2)
CHLORIDE SPEC-SCNC: 103 MMOL/L (ref 98–107)
CO2 SERPL-SCNC: 23 MMOL/L (ref 22–30)
CREAT BLD-SCNC: 1 MG/DL (ref 0.66–1.25)
ESTIMATED GLOMERULAR FILT RATE: 77 ML/MIN (ref 60–?)
GFR (AFRICAN AMERICAN): 93 ML/MIN (ref 60–?)
GLOBULIN SER CALC-MCNC: 2.4 G/DL (ref 1.3–3.2)
GLUCOSE: 176 MG/DL (ref 74–100)
HCT VFR BLD CALC: 44.3 % (ref 42–52)
HGB BLD-MCNC: 14.5 G/DL (ref 14.1–18)
MCHC RBC-ENTMCNC: 32.6 G/DL (ref 31.8–35.4)
MCV RBC: 86.6 FL (ref 80–94)
MEAN CORPUSCULAR HEMOGLOBIN: 28.2 PG (ref 27–31.2)
PLATELET # BLD: 435 K/MM3 (ref 142–424)
POTASSIUM: 4.3 MMOL/L (ref 3.5–5.1)
PROT SERPL-MCNC: 6.7 G/DL (ref 6.3–8.2)
RBC # BLD AUTO: 5.12 M/MM3 (ref 4.6–6.2)
SODIUM SPEC-SCNC: 137 MMOL/L (ref 136–145)
WBC # BLD AUTO: 9 K/MM3 (ref 4.8–10.8)

## 2020-04-03 PROCEDURE — 84155 ASSAY OF PROTEIN SERUM: CPT

## 2020-04-03 PROCEDURE — 80053 COMPREHEN METABOLIC PANEL: CPT

## 2020-04-03 PROCEDURE — 84165 PROTEIN E-PHORESIS SERUM: CPT

## 2020-04-03 PROCEDURE — 85025 COMPLETE CBC W/AUTO DIFF WBC: CPT

## 2020-04-03 PROCEDURE — 36415 COLL VENOUS BLD VENIPUNCTURE: CPT

## 2020-04-03 PROCEDURE — 82784 ASSAY IGA/IGD/IGG/IGM EACH: CPT

## 2020-04-03 PROCEDURE — 83883 ASSAY NEPHELOMETRY NOT SPEC: CPT

## 2020-04-04 LAB
IGA SERPL-MCNC: 62 MG/DL (ref 90–386)
IGG SERPL-MCNC: 703 MG/DL (ref 603–1613)
IGM SERPL-MCNC: 316 MG/DL (ref 20–172)

## 2020-04-06 LAB
ALBUMIN SERPL ELPH-MCNC: 3.6 G/DL (ref 2.9–4.4)
ALPHA1 GLOB SERPL ELPH-MCNC: 0.2 G/DL (ref 0–0.4)
ALPHA2 GLOB SERPL ELPH-MCNC: 0.8 G/DL (ref 0.4–1)
GAMMA GLOBULIN: 0.8 G/DL (ref 0.4–1.8)

## 2020-04-28 ENCOUNTER — HOSPITAL ENCOUNTER (OUTPATIENT)
Age: 60
End: 2020-04-28
Payer: COMMERCIAL

## 2020-04-28 DIAGNOSIS — E11.65: Primary | ICD-10-CM

## 2020-04-28 DIAGNOSIS — E78.1: ICD-10-CM

## 2020-04-28 DIAGNOSIS — Z79.4: ICD-10-CM

## 2020-04-28 DIAGNOSIS — E66.9: ICD-10-CM

## 2020-04-28 PROCEDURE — 36415 COLL VENOUS BLD VENIPUNCTURE: CPT

## 2020-07-06 ENCOUNTER — HOSPITAL ENCOUNTER (OUTPATIENT)
Age: 60
End: 2020-07-06
Payer: COMMERCIAL

## 2020-07-06 DIAGNOSIS — D47.2: Primary | ICD-10-CM

## 2020-07-06 DIAGNOSIS — C90.00: ICD-10-CM

## 2020-07-06 LAB
ALBUMIN LEVEL: 4.4 G/DL (ref 3.5–5)
ALBUMIN/GLOB SERPL: 1.6 {RATIO} (ref 1.1–1.8)
ALP ISO SERPL-ACNC: 141 U/L (ref 38–126)
ALT SERPLBLD-CCNC: 29 U/L (ref 12–78)
ANION GAP SERPL CALC-SCNC: 11.8 MEQ/L (ref 5–15)
AST SERPL QL: 47 U/L (ref 17–59)
BILIRUBIN,TOTAL: 0.4 MG/DL (ref 0.2–1.3)
BUN SERPL-MCNC: 14 MG/DL (ref 9–20)
CALCIUM SPEC-MCNC: 9.3 MG/DL (ref 8.4–10.2)
CHLORIDE SPEC-SCNC: 101 MMOL/L (ref 98–107)
CO2 SERPL-SCNC: 29 MMOL/L (ref 22–30)
CREAT BLD-SCNC: 1.2 MG/DL (ref 0.66–1.25)
ESTIMATED GLOMERULAR FILT RATE: 62 ML/MIN (ref 60–?)
GFR (AFRICAN AMERICAN): 75 ML/MIN (ref 60–?)
GLOBULIN SER CALC-MCNC: 2.7 G/DL (ref 1.3–3.2)
GLUCOSE: 115 MG/DL (ref 74–100)
HCT VFR BLD CALC: 45.3 % (ref 42–52)
HGB BLD-MCNC: 15.6 G/DL (ref 14.1–18)
MCHC RBC-ENTMCNC: 34.5 G/DL (ref 31.8–35.4)
MCV RBC: 86.1 FL (ref 80–94)
MEAN CORPUSCULAR HEMOGLOBIN: 29.7 PG (ref 27–31.2)
PLATELET # BLD: 417 K/MM3 (ref 142–424)
POTASSIUM: 4.8 MMOL/L (ref 3.5–5.1)
PROT SERPL-MCNC: 7.1 G/DL (ref 6.3–8.2)
RBC # BLD AUTO: 5.26 M/MM3 (ref 4.6–6.2)
SODIUM SPEC-SCNC: 137 MMOL/L (ref 136–145)
WBC # BLD AUTO: 9.6 K/MM3 (ref 4.8–10.8)

## 2020-07-06 PROCEDURE — 85025 COMPLETE CBC W/AUTO DIFF WBC: CPT

## 2020-07-06 PROCEDURE — 83883 ASSAY NEPHELOMETRY NOT SPEC: CPT

## 2020-07-06 PROCEDURE — 80053 COMPREHEN METABOLIC PANEL: CPT

## 2020-07-06 PROCEDURE — 36415 COLL VENOUS BLD VENIPUNCTURE: CPT

## 2020-07-06 PROCEDURE — 82784 ASSAY IGA/IGD/IGG/IGM EACH: CPT

## 2020-07-07 LAB
IGA SERPL-MCNC: 57 MG/DL (ref 90–386)
IGG SERPL-MCNC: 653 MG/DL (ref 603–1613)
IGM SERPL-MCNC: 326 MG/DL (ref 20–172)

## 2020-07-16 ENCOUNTER — HOSPITAL ENCOUNTER (OUTPATIENT)
Age: 60
End: 2020-07-16
Payer: COMMERCIAL

## 2020-07-16 DIAGNOSIS — C90.00: Primary | ICD-10-CM

## 2020-07-16 PROCEDURE — 84165 PROTEIN E-PHORESIS SERUM: CPT

## 2020-07-16 PROCEDURE — 84155 ASSAY OF PROTEIN SERUM: CPT

## 2020-07-16 PROCEDURE — 36415 COLL VENOUS BLD VENIPUNCTURE: CPT

## 2020-07-17 LAB
ALBUMIN SERPL ELPH-MCNC: 3.5 G/DL (ref 2.9–4.4)
ALPHA1 GLOB SERPL ELPH-MCNC: 0.2 G/DL (ref 0–0.4)
ALPHA2 GLOB SERPL ELPH-MCNC: 0.8 G/DL (ref 0.4–1)
GAMMA GLOBULIN: 0.8 G/DL (ref 0.4–1.8)

## 2020-08-07 ENCOUNTER — HOSPITAL ENCOUNTER (OUTPATIENT)
Age: 60
End: 2020-08-07
Payer: COMMERCIAL

## 2020-08-07 DIAGNOSIS — Z79.84: ICD-10-CM

## 2020-08-07 DIAGNOSIS — E11.69: Primary | ICD-10-CM

## 2020-08-07 PROCEDURE — 36415 COLL VENOUS BLD VENIPUNCTURE: CPT

## 2020-09-07 ENCOUNTER — HOSPITAL ENCOUNTER (EMERGENCY)
Age: 60
Discharge: HOME | End: 2020-09-07
Payer: COMMERCIAL

## 2020-09-07 VITALS
OXYGEN SATURATION: 99 % | HEART RATE: 91 BPM | SYSTOLIC BLOOD PRESSURE: 131 MMHG | TEMPERATURE: 98.24 F | DIASTOLIC BLOOD PRESSURE: 90 MMHG | RESPIRATION RATE: 17 BRPM

## 2020-09-07 VITALS
SYSTOLIC BLOOD PRESSURE: 131 MMHG | OXYGEN SATURATION: 99 % | HEART RATE: 91 BPM | RESPIRATION RATE: 17 BRPM | DIASTOLIC BLOOD PRESSURE: 90 MMHG | TEMPERATURE: 98.2 F

## 2020-09-07 VITALS — BODY MASS INDEX: 46 KG/M2

## 2020-09-07 DIAGNOSIS — E78.5: ICD-10-CM

## 2020-09-07 DIAGNOSIS — J01.90: ICD-10-CM

## 2020-09-07 DIAGNOSIS — I10: ICD-10-CM

## 2020-09-07 DIAGNOSIS — I49.5: ICD-10-CM

## 2020-09-07 DIAGNOSIS — Z79.4: ICD-10-CM

## 2020-09-07 DIAGNOSIS — Z90.49: ICD-10-CM

## 2020-09-07 DIAGNOSIS — Z79.899: ICD-10-CM

## 2020-09-07 DIAGNOSIS — E11.9: ICD-10-CM

## 2020-09-07 DIAGNOSIS — Z79.84: ICD-10-CM

## 2020-09-07 DIAGNOSIS — U07.1: Primary | ICD-10-CM

## 2020-09-07 DIAGNOSIS — Z95.0: ICD-10-CM

## 2020-09-07 PROCEDURE — 99201: CPT

## 2020-09-07 PROCEDURE — U0004 COV-19 TEST NON-CDC HGH THRU: HCPCS

## 2020-09-09 LAB — COVID-19 NASAL PCR SENDOUT LEX: POSITIVE

## 2020-09-12 ENCOUNTER — HOSPITAL ENCOUNTER (INPATIENT)
Dept: HOSPITAL 22 - ER | Age: 60
LOS: 4 days | Discharge: HOME | DRG: 178 | End: 2020-09-16
Payer: COMMERCIAL

## 2020-09-12 VITALS
HEART RATE: 105 BPM | SYSTOLIC BLOOD PRESSURE: 117 MMHG | RESPIRATION RATE: 22 BRPM | TEMPERATURE: 99.5 F | DIASTOLIC BLOOD PRESSURE: 72 MMHG | OXYGEN SATURATION: 87 %

## 2020-09-12 VITALS
TEMPERATURE: 100.04 F | SYSTOLIC BLOOD PRESSURE: 129 MMHG | HEART RATE: 91 BPM | OXYGEN SATURATION: 94 % | DIASTOLIC BLOOD PRESSURE: 74 MMHG | RESPIRATION RATE: 18 BRPM

## 2020-09-12 VITALS
HEART RATE: 96 BPM | RESPIRATION RATE: 22 BRPM | OXYGEN SATURATION: 95 % | DIASTOLIC BLOOD PRESSURE: 69 MMHG | SYSTOLIC BLOOD PRESSURE: 116 MMHG

## 2020-09-12 VITALS
HEART RATE: 95 BPM | OXYGEN SATURATION: 94 % | SYSTOLIC BLOOD PRESSURE: 127 MMHG | RESPIRATION RATE: 22 BRPM | DIASTOLIC BLOOD PRESSURE: 79 MMHG | TEMPERATURE: 97.88 F

## 2020-09-12 VITALS
HEART RATE: 94 BPM | OXYGEN SATURATION: 96 % | SYSTOLIC BLOOD PRESSURE: 127 MMHG | DIASTOLIC BLOOD PRESSURE: 79 MMHG | RESPIRATION RATE: 22 BRPM

## 2020-09-12 VITALS
BODY MASS INDEX: 45.5 KG/M2 | BODY MASS INDEX: 44.7 KG/M2 | BODY MASS INDEX: 44 KG/M2 | BODY MASS INDEX: 45.3 KG/M2 | BODY MASS INDEX: 45.8 KG/M2 | BODY MASS INDEX: 44.9 KG/M2

## 2020-09-12 VITALS — HEART RATE: 100 BPM

## 2020-09-12 VITALS — OXYGEN SATURATION: 95 %

## 2020-09-12 DIAGNOSIS — E66.01: ICD-10-CM

## 2020-09-12 DIAGNOSIS — U07.1: Primary | ICD-10-CM

## 2020-09-12 DIAGNOSIS — Z79.01: ICD-10-CM

## 2020-09-12 DIAGNOSIS — E78.5: ICD-10-CM

## 2020-09-12 DIAGNOSIS — Z79.4: ICD-10-CM

## 2020-09-12 DIAGNOSIS — Z88.8: ICD-10-CM

## 2020-09-12 DIAGNOSIS — Z95.0: ICD-10-CM

## 2020-09-12 DIAGNOSIS — I10: ICD-10-CM

## 2020-09-12 DIAGNOSIS — D46.9: ICD-10-CM

## 2020-09-12 DIAGNOSIS — I48.20: ICD-10-CM

## 2020-09-12 DIAGNOSIS — Z88.7: ICD-10-CM

## 2020-09-12 DIAGNOSIS — R09.02: ICD-10-CM

## 2020-09-12 DIAGNOSIS — E11.9: ICD-10-CM

## 2020-09-12 LAB
ALBUMIN LEVEL: 4 G/DL (ref 3.5–5)
ALBUMIN/GLOB SERPL: 1.4 {RATIO} (ref 1.1–1.8)
ALP ISO SERPL-ACNC: 80 U/L (ref 38–126)
ALT SERPLBLD-CCNC: 32 U/L (ref 12–78)
ANION GAP SERPL CALC-SCNC: 15.9 MEQ/L (ref 5–15)
AST SERPL QL: 83 U/L (ref 17–59)
BILIRUBIN,TOTAL: 0.8 MG/DL (ref 0.2–1.3)
BUN SERPL-MCNC: 11 MG/DL (ref 9–20)
CALCIUM SPEC-MCNC: 8.7 MG/DL (ref 8.4–10.2)
CHLORIDE SPEC-SCNC: 97 MMOL/L (ref 98–107)
CO2 BLDCOA-SCNC: 24.2 MMHG (ref 23–27)
CO2 SERPL-SCNC: 26 MMOL/L (ref 22–30)
CREAT BLD-SCNC: 1 MG/DL (ref 0.66–1.25)
CREATININE CLEARANCE ESTIMATED: 87 ML/MIN (ref 50–200)
ESTIMATED GLOMERULAR FILT RATE: 76 ML/MIN (ref 60–?)
GFR (AFRICAN AMERICAN): 93 ML/MIN (ref 60–?)
GLOBULIN SER CALC-MCNC: 2.9 G/DL (ref 1.3–3.2)
GLUCOSE BLDC GLUCOMTR-MCNC: 157 MG/DL (ref 70–110)
GLUCOSE: 122 MG/DL (ref 74–100)
HCO3 BLDA-SCNC: 23.1 MMHG (ref 22–26)
HCT VFR BLD CALC: 42.6 % (ref 42–52)
HGB BLD-MCNC: 15 G/DL (ref 14.1–18)
MAGNESIUM: 2 MG/DL (ref 1.6–2.3)
MCHC RBC-ENTMCNC: 35.2 G/DL (ref 31.8–35.4)
MCV RBC: 84.9 FL (ref 80–94)
MEAN CORPUSCULAR HEMOGLOBIN: 29.9 PG (ref 27–31.2)
PCO2 BLDA: 33.8 MMHG (ref 35–45)
PH BLDA: 7.45 MMOL/L (ref 7.35–7.45)
PLATELET # BLD: 325 K/MM3 (ref 142–424)
PO2 BLDA: 91.4 MMHG (ref 80–100)
POTASSIUM: 3.9 MMOL/L (ref 3.5–5.1)
PROT SERPL-MCNC: 6.9 G/DL (ref 6.3–8.2)
RBC # BLD AUTO: 5.02 M/MM3 (ref 4.6–6.2)
SODIUM SPEC-SCNC: 135 MMOL/L (ref 136–145)
SOURCE: (no result)
WBC # BLD AUTO: 7.9 K/MM3 (ref 4.8–10.8)

## 2020-09-12 PROCEDURE — 87205 SMEAR GRAM STAIN: CPT

## 2020-09-12 PROCEDURE — 87186 SC STD MICRODIL/AGAR DIL: CPT

## 2020-09-12 PROCEDURE — 87077 CULTURE AEROBIC IDENTIFY: CPT

## 2020-09-12 PROCEDURE — 80048 BASIC METABOLIC PNL TOTAL CA: CPT

## 2020-09-12 PROCEDURE — 86140 C-REACTIVE PROTEIN: CPT

## 2020-09-12 PROCEDURE — 94760 N-INVAS EAR/PLS OXIMETRY 1: CPT

## 2020-09-12 PROCEDURE — 96374 THER/PROPH/DIAG INJ IV PUSH: CPT

## 2020-09-12 PROCEDURE — 82962 GLUCOSE BLOOD TEST: CPT

## 2020-09-12 PROCEDURE — 99284 EMERGENCY DEPT VISIT MOD MDM: CPT

## 2020-09-12 PROCEDURE — 71045 X-RAY EXAM CHEST 1 VIEW: CPT

## 2020-09-12 PROCEDURE — 82803 BLOOD GASES ANY COMBINATION: CPT

## 2020-09-12 PROCEDURE — 80053 COMPREHEN METABOLIC PANEL: CPT

## 2020-09-12 PROCEDURE — 85025 COMPLETE CBC W/AUTO DIFF WBC: CPT

## 2020-09-12 PROCEDURE — 93005 ELECTROCARDIOGRAM TRACING: CPT

## 2020-09-12 PROCEDURE — 94640 AIRWAY INHALATION TREATMENT: CPT

## 2020-09-12 PROCEDURE — 80202 ASSAY OF VANCOMYCIN: CPT

## 2020-09-12 PROCEDURE — 85007 BL SMEAR W/DIFF WBC COUNT: CPT

## 2020-09-12 PROCEDURE — 83735 ASSAY OF MAGNESIUM: CPT

## 2020-09-12 PROCEDURE — 87507 IADNA-DNA/RNA PROBE TQ 12-25: CPT

## 2020-09-12 PROCEDURE — 83605 ASSAY OF LACTIC ACID: CPT

## 2020-09-12 PROCEDURE — 87040 BLOOD CULTURE FOR BACTERIA: CPT

## 2020-09-13 VITALS
TEMPERATURE: 97.52 F | DIASTOLIC BLOOD PRESSURE: 71 MMHG | SYSTOLIC BLOOD PRESSURE: 121 MMHG | HEART RATE: 88 BPM | RESPIRATION RATE: 18 BRPM | OXYGEN SATURATION: 91 %

## 2020-09-13 VITALS — TEMPERATURE: 97.34 F

## 2020-09-13 VITALS
DIASTOLIC BLOOD PRESSURE: 81 MMHG | OXYGEN SATURATION: 92 % | TEMPERATURE: 98.24 F | SYSTOLIC BLOOD PRESSURE: 109 MMHG | HEART RATE: 92 BPM | RESPIRATION RATE: 20 BRPM

## 2020-09-13 VITALS — TEMPERATURE: 101.3 F

## 2020-09-13 VITALS — OXYGEN SATURATION: 90 %

## 2020-09-13 VITALS
TEMPERATURE: 98.8 F | RESPIRATION RATE: 18 BRPM | DIASTOLIC BLOOD PRESSURE: 87 MMHG | OXYGEN SATURATION: 91 % | HEART RATE: 92 BPM | SYSTOLIC BLOOD PRESSURE: 142 MMHG

## 2020-09-13 VITALS
RESPIRATION RATE: 18 BRPM | DIASTOLIC BLOOD PRESSURE: 64 MMHG | OXYGEN SATURATION: 92 % | HEART RATE: 94 BPM | SYSTOLIC BLOOD PRESSURE: 119 MMHG | TEMPERATURE: 98.96 F

## 2020-09-13 VITALS
RESPIRATION RATE: 16 BRPM | SYSTOLIC BLOOD PRESSURE: 131 MMHG | DIASTOLIC BLOOD PRESSURE: 84 MMHG | TEMPERATURE: 98.42 F | HEART RATE: 89 BPM | OXYGEN SATURATION: 94 %

## 2020-09-13 VITALS
SYSTOLIC BLOOD PRESSURE: 123 MMHG | RESPIRATION RATE: 18 BRPM | TEMPERATURE: 100.22 F | DIASTOLIC BLOOD PRESSURE: 67 MMHG | OXYGEN SATURATION: 91 % | HEART RATE: 90 BPM

## 2020-09-13 VITALS — TEMPERATURE: 98.24 F

## 2020-09-13 VITALS — HEART RATE: 80 BPM

## 2020-09-13 VITALS — HEART RATE: 90 BPM

## 2020-09-13 VITALS — HEART RATE: 70 BPM

## 2020-09-13 VITALS — OXYGEN SATURATION: 88 %

## 2020-09-13 LAB
ANION GAP SERPL CALC-SCNC: 12.5 MEQ/L (ref 5–15)
BUN SERPL-MCNC: 10 MG/DL (ref 9–20)
CALCIUM SPEC-MCNC: 7.2 MG/DL (ref 8.4–10.2)
CHLORIDE SPEC-SCNC: 108 MMOL/L (ref 98–107)
CO2 SERPL-SCNC: 22 MMOL/L (ref 22–30)
CREAT BLD-SCNC: 0.7 MG/DL (ref 0.66–1.25)
CREATININE CLEARANCE ESTIMATED: 125 ML/MIN (ref 50–200)
ESTIMATED GLOMERULAR FILT RATE: 115 ML/MIN (ref 60–?)
GFR (AFRICAN AMERICAN): 140 ML/MIN (ref 60–?)
GLUCOSE BLDC GLUCOMTR-MCNC: 155 MG/DL (ref 70–110)
GLUCOSE BLDC GLUCOMTR-MCNC: 156 MG/DL (ref 70–110)
GLUCOSE BLDC GLUCOMTR-MCNC: 191 MG/DL (ref 70–110)
GLUCOSE BLDC GLUCOMTR-MCNC: 243 MG/DL (ref 70–110)
GLUCOSE: 151 MG/DL (ref 74–100)
HCT VFR BLD CALC: 40.1 % (ref 42–52)
HGB BLD-MCNC: 13.7 G/DL (ref 14.1–18)
MCHC RBC-ENTMCNC: 34.1 G/DL (ref 31.8–35.4)
MCV RBC: 86.6 FL (ref 80–94)
MEAN CORPUSCULAR HEMOGLOBIN: 29.6 PG (ref 27–31.2)
PLATELET # BLD: 316 K/MM3 (ref 142–424)
POTASSIUM: 3.5 MMOL/L (ref 3.5–5.1)
RBC # BLD AUTO: 4.63 M/MM3 (ref 4.6–6.2)
SODIUM SPEC-SCNC: 139 MMOL/L (ref 136–145)
WBC # BLD AUTO: 5 K/MM3 (ref 4.8–10.8)

## 2020-09-14 VITALS
TEMPERATURE: 97.7 F | RESPIRATION RATE: 16 BRPM | SYSTOLIC BLOOD PRESSURE: 121 MMHG | HEART RATE: 79 BPM | OXYGEN SATURATION: 90 % | DIASTOLIC BLOOD PRESSURE: 68 MMHG

## 2020-09-14 VITALS
SYSTOLIC BLOOD PRESSURE: 147 MMHG | RESPIRATION RATE: 18 BRPM | TEMPERATURE: 98.7 F | HEART RATE: 93 BPM | OXYGEN SATURATION: 90 % | DIASTOLIC BLOOD PRESSURE: 76 MMHG

## 2020-09-14 VITALS
DIASTOLIC BLOOD PRESSURE: 55 MMHG | RESPIRATION RATE: 18 BRPM | SYSTOLIC BLOOD PRESSURE: 111 MMHG | TEMPERATURE: 99.14 F | OXYGEN SATURATION: 92 % | HEART RATE: 100 BPM

## 2020-09-14 VITALS
SYSTOLIC BLOOD PRESSURE: 105 MMHG | DIASTOLIC BLOOD PRESSURE: 55 MMHG | RESPIRATION RATE: 18 BRPM | OXYGEN SATURATION: 91 % | TEMPERATURE: 99.3 F | HEART RATE: 90 BPM

## 2020-09-14 VITALS — HEART RATE: 90 BPM

## 2020-09-14 VITALS
TEMPERATURE: 98.96 F | SYSTOLIC BLOOD PRESSURE: 124 MMHG | RESPIRATION RATE: 18 BRPM | DIASTOLIC BLOOD PRESSURE: 71 MMHG | OXYGEN SATURATION: 91 % | HEART RATE: 89 BPM

## 2020-09-14 VITALS — OXYGEN SATURATION: 86 %

## 2020-09-14 LAB
ANION GAP SERPL CALC-SCNC: 14.6 MEQ/L (ref 5–15)
BUN SERPL-MCNC: 15 MG/DL (ref 9–20)
CALCIUM SPEC-MCNC: 8.9 MG/DL (ref 8.4–10.2)
CHLORIDE SPEC-SCNC: 101 MMOL/L (ref 98–107)
CO2 SERPL-SCNC: 24 MMOL/L (ref 22–30)
CREAT BLD-SCNC: 1.1 MG/DL (ref 0.66–1.25)
CREATININE CLEARANCE ESTIMATED: 79 ML/MIN (ref 50–200)
CRP SERPL HS-MCNC: 51.4 MG/L (ref 0–4)
ESTIMATED GLOMERULAR FILT RATE: 69 ML/MIN (ref 60–?)
GFR (AFRICAN AMERICAN): 83 ML/MIN (ref 60–?)
GLUCOSE BLDC GLUCOMTR-MCNC: 189 MG/DL (ref 70–110)
GLUCOSE BLDC GLUCOMTR-MCNC: 207 MG/DL (ref 70–110)
GLUCOSE: 174 MG/DL (ref 74–100)
MAGNESIUM: 2 MG/DL (ref 1.6–2.3)
POTASSIUM: 4.6 MMOL/L (ref 3.5–5.1)
SODIUM SPEC-SCNC: 135 MMOL/L (ref 136–145)
VANCOMYCIN TROUGH SERPL-MCNC: 13 UG/ML (ref 5–10)

## 2020-09-15 VITALS
SYSTOLIC BLOOD PRESSURE: 132 MMHG | TEMPERATURE: 97.88 F | RESPIRATION RATE: 24 BRPM | DIASTOLIC BLOOD PRESSURE: 85 MMHG | HEART RATE: 90 BPM

## 2020-09-15 VITALS
SYSTOLIC BLOOD PRESSURE: 115 MMHG | DIASTOLIC BLOOD PRESSURE: 68 MMHG | OXYGEN SATURATION: 89 % | TEMPERATURE: 98.1 F | RESPIRATION RATE: 18 BRPM | HEART RATE: 90 BPM

## 2020-09-15 VITALS
TEMPERATURE: 97.8 F | DIASTOLIC BLOOD PRESSURE: 80 MMHG | RESPIRATION RATE: 20 BRPM | OXYGEN SATURATION: 90 % | SYSTOLIC BLOOD PRESSURE: 117 MMHG | HEART RATE: 68 BPM

## 2020-09-15 VITALS
SYSTOLIC BLOOD PRESSURE: 141 MMHG | OXYGEN SATURATION: 91 % | DIASTOLIC BLOOD PRESSURE: 73 MMHG | TEMPERATURE: 97.1 F | HEART RATE: 55 BPM | RESPIRATION RATE: 16 BRPM

## 2020-09-15 VITALS
DIASTOLIC BLOOD PRESSURE: 79 MMHG | TEMPERATURE: 97.88 F | OXYGEN SATURATION: 91 % | SYSTOLIC BLOOD PRESSURE: 137 MMHG | RESPIRATION RATE: 18 BRPM | HEART RATE: 80 BPM

## 2020-09-15 VITALS — OXYGEN SATURATION: 89 %

## 2020-09-15 VITALS
TEMPERATURE: 97.52 F | HEART RATE: 64 BPM | DIASTOLIC BLOOD PRESSURE: 89 MMHG | SYSTOLIC BLOOD PRESSURE: 144 MMHG | OXYGEN SATURATION: 89 % | RESPIRATION RATE: 20 BRPM

## 2020-09-15 VITALS
RESPIRATION RATE: 20 BRPM | OXYGEN SATURATION: 91 % | DIASTOLIC BLOOD PRESSURE: 84 MMHG | HEART RATE: 70 BPM | TEMPERATURE: 97.52 F | SYSTOLIC BLOOD PRESSURE: 128 MMHG

## 2020-09-15 VITALS — HEART RATE: 80 BPM

## 2020-09-15 VITALS — HEART RATE: 60 BPM

## 2020-09-15 LAB
ALBUMIN LEVEL: 3.4 G/DL (ref 3.5–5)
ALBUMIN/GLOB SERPL: 1.2 {RATIO} (ref 1.1–1.8)
ALP ISO SERPL-ACNC: 57 U/L (ref 38–126)
ALT SERPLBLD-CCNC: 34 U/L (ref 12–78)
ANION GAP SERPL CALC-SCNC: 12.5 MEQ/L (ref 5–15)
AST SERPL QL: 52 U/L (ref 17–59)
BILIRUBIN,TOTAL: 1 MG/DL (ref 0.2–1.3)
BUN SERPL-MCNC: 16 MG/DL (ref 9–20)
CALCIUM SPEC-MCNC: 8.8 MG/DL (ref 8.4–10.2)
CELLS COUNTED: 100
CHLORIDE SPEC-SCNC: 104 MMOL/L (ref 98–107)
CO2 SERPL-SCNC: 24 MMOL/L (ref 22–30)
CREAT BLD-SCNC: 0.9 MG/DL (ref 0.66–1.25)
CREATININE CLEARANCE ESTIMATED: 100 ML/MIN (ref 50–200)
CRP SERPL HS-MCNC: 61.3 MG/L (ref 0–4)
ESTIMATED GLOMERULAR FILT RATE: 86 ML/MIN (ref 60–?)
GFR (AFRICAN AMERICAN): 105 ML/MIN (ref 60–?)
GLOBULIN SER CALC-MCNC: 2.9 G/DL (ref 1.3–3.2)
GLUCOSE: 204 MG/DL (ref 74–100)
HCT VFR BLD CALC: 41.7 % (ref 42–52)
HGB BLD-MCNC: 14.4 G/DL (ref 14.1–18)
MANUAL DIFFERENTIAL: (no result)
MCHC RBC-ENTMCNC: 34.4 G/DL (ref 31.8–35.4)
MCV RBC: 88 FL (ref 80–94)
MEAN CORPUSCULAR HEMOGLOBIN: 30.3 PG (ref 27–31.2)
PLATELET # BLD: 380 K/MM3 (ref 142–424)
POTASSIUM: 4.5 MMOL/L (ref 3.5–5.1)
PROT SERPL-MCNC: 6.3 G/DL (ref 6.3–8.2)
RBC # BLD AUTO: 4.74 M/MM3 (ref 4.6–6.2)
SODIUM SPEC-SCNC: 136 MMOL/L (ref 136–145)
WBC # BLD AUTO: 16.6 K/MM3 (ref 4.8–10.8)

## 2020-09-16 VITALS
DIASTOLIC BLOOD PRESSURE: 77 MMHG | OXYGEN SATURATION: 91 % | RESPIRATION RATE: 19 BRPM | SYSTOLIC BLOOD PRESSURE: 141 MMHG | TEMPERATURE: 98.3 F | HEART RATE: 70 BPM

## 2020-09-16 VITALS
HEART RATE: 70 BPM | TEMPERATURE: 97.2 F | SYSTOLIC BLOOD PRESSURE: 144 MMHG | DIASTOLIC BLOOD PRESSURE: 80 MMHG | OXYGEN SATURATION: 86 % | RESPIRATION RATE: 20 BRPM

## 2020-09-16 VITALS — HEART RATE: 70 BPM

## 2020-09-16 LAB
ACANTHOCYTES: (no result)
ANION GAP SERPL CALC-SCNC: 14.5 MEQ/L (ref 5–15)
BUN SERPL-MCNC: 16 MG/DL (ref 9–20)
CALCIUM SPEC-MCNC: 9 MG/DL (ref 8.4–10.2)
CELLS COUNTED: 100
CHLORIDE SPEC-SCNC: 104 MMOL/L (ref 98–107)
CO2 SERPL-SCNC: 24 MMOL/L (ref 22–30)
CREAT BLD-SCNC: 0.9 MG/DL (ref 0.66–1.25)
CREATININE CLEARANCE ESTIMATED: 97 ML/MIN (ref 50–200)
ESTIMATED GLOMERULAR FILT RATE: 86 ML/MIN (ref 60–?)
GFR (AFRICAN AMERICAN): 105 ML/MIN (ref 60–?)
GLUCOSE BLDC GLUCOMTR-MCNC: 201 MG/DL (ref 70–110)
GLUCOSE BLDC GLUCOMTR-MCNC: 230 MG/DL (ref 70–110)
GLUCOSE BLDC GLUCOMTR-MCNC: 231 MG/DL (ref 70–110)
GLUCOSE: 214 MG/DL (ref 74–100)
HCT VFR BLD CALC: 41.8 % (ref 42–52)
HGB BLD-MCNC: 14.2 G/DL (ref 14.1–18)
MANUAL DIFFERENTIAL: (no result)
MCHC RBC-ENTMCNC: 33.9 G/DL (ref 31.8–35.4)
MCV RBC: 87.6 FL (ref 80–94)
MEAN CORPUSCULAR HEMOGLOBIN: 29.7 PG (ref 27–31.2)
PLATELET # BLD: 445 K/MM3 (ref 142–424)
POTASSIUM: 4.5 MMOL/L (ref 3.5–5.1)
RBC # BLD AUTO: 4.77 M/MM3 (ref 4.6–6.2)
SODIUM SPEC-SCNC: 138 MMOL/L (ref 136–145)
WBC # BLD AUTO: 16.5 K/MM3 (ref 4.8–10.8)

## 2020-09-19 ENCOUNTER — HOSPITAL ENCOUNTER (EMERGENCY)
Age: 60
Discharge: HOME | End: 2020-09-19
Payer: COMMERCIAL

## 2020-09-19 VITALS — DIASTOLIC BLOOD PRESSURE: 71 MMHG | OXYGEN SATURATION: 95 % | SYSTOLIC BLOOD PRESSURE: 125 MMHG | HEART RATE: 68 BPM

## 2020-09-19 VITALS — SYSTOLIC BLOOD PRESSURE: 120 MMHG | HEART RATE: 58 BPM | DIASTOLIC BLOOD PRESSURE: 71 MMHG | OXYGEN SATURATION: 92 %

## 2020-09-19 VITALS — DIASTOLIC BLOOD PRESSURE: 69 MMHG | HEART RATE: 71 BPM | SYSTOLIC BLOOD PRESSURE: 113 MMHG | OXYGEN SATURATION: 96 %

## 2020-09-19 VITALS — BODY MASS INDEX: 44.7 KG/M2

## 2020-09-19 VITALS
DIASTOLIC BLOOD PRESSURE: 85 MMHG | TEMPERATURE: 98.24 F | RESPIRATION RATE: 18 BRPM | SYSTOLIC BLOOD PRESSURE: 124 MMHG | HEART RATE: 87 BPM | OXYGEN SATURATION: 95 %

## 2020-09-19 VITALS
RESPIRATION RATE: 25 BRPM | TEMPERATURE: 97.9 F | SYSTOLIC BLOOD PRESSURE: 125 MMHG | OXYGEN SATURATION: 94 % | HEART RATE: 74 BPM | DIASTOLIC BLOOD PRESSURE: 71 MMHG

## 2020-09-19 VITALS — DIASTOLIC BLOOD PRESSURE: 69 MMHG | HEART RATE: 79 BPM | OXYGEN SATURATION: 93 % | SYSTOLIC BLOOD PRESSURE: 122 MMHG

## 2020-09-19 DIAGNOSIS — E11.9: ICD-10-CM

## 2020-09-19 DIAGNOSIS — I10: ICD-10-CM

## 2020-09-19 DIAGNOSIS — F41.0: Primary | ICD-10-CM

## 2020-09-19 DIAGNOSIS — E78.5: ICD-10-CM

## 2020-09-19 DIAGNOSIS — Z79.899: ICD-10-CM

## 2020-09-19 DIAGNOSIS — Z95.0: ICD-10-CM

## 2020-09-19 DIAGNOSIS — U07.1: ICD-10-CM

## 2020-09-19 DIAGNOSIS — I48.20: ICD-10-CM

## 2020-09-19 PROCEDURE — 93005 ELECTROCARDIOGRAM TRACING: CPT

## 2020-09-19 PROCEDURE — 99284 EMERGENCY DEPT VISIT MOD MDM: CPT

## 2020-09-19 PROCEDURE — 96374 THER/PROPH/DIAG INJ IV PUSH: CPT

## 2020-09-20 ENCOUNTER — HOSPITAL ENCOUNTER (OUTPATIENT)
Dept: HOSPITAL 22 - ER | Age: 60
Setting detail: OBSERVATION
LOS: 4 days | Discharge: HOME | End: 2020-09-24
Payer: COMMERCIAL

## 2020-09-20 VITALS — OXYGEN SATURATION: 93 % | DIASTOLIC BLOOD PRESSURE: 74 MMHG | SYSTOLIC BLOOD PRESSURE: 134 MMHG | HEART RATE: 78 BPM

## 2020-09-20 VITALS
RESPIRATION RATE: 16 BRPM | OXYGEN SATURATION: 99 % | SYSTOLIC BLOOD PRESSURE: 114 MMHG | HEART RATE: 82 BPM | DIASTOLIC BLOOD PRESSURE: 71 MMHG

## 2020-09-20 VITALS
RESPIRATION RATE: 17 BRPM | OXYGEN SATURATION: 92 % | SYSTOLIC BLOOD PRESSURE: 122 MMHG | DIASTOLIC BLOOD PRESSURE: 78 MMHG | HEART RATE: 92 BPM

## 2020-09-20 VITALS
HEART RATE: 97 BPM | DIASTOLIC BLOOD PRESSURE: 64 MMHG | OXYGEN SATURATION: 94 % | RESPIRATION RATE: 17 BRPM | SYSTOLIC BLOOD PRESSURE: 104 MMHG

## 2020-09-20 VITALS
RESPIRATION RATE: 17 BRPM | DIASTOLIC BLOOD PRESSURE: 70 MMHG | SYSTOLIC BLOOD PRESSURE: 137 MMHG | OXYGEN SATURATION: 95 % | HEART RATE: 86 BPM

## 2020-09-20 VITALS
OXYGEN SATURATION: 94 % | HEART RATE: 95 BPM | RESPIRATION RATE: 17 BRPM | SYSTOLIC BLOOD PRESSURE: 151 MMHG | DIASTOLIC BLOOD PRESSURE: 66 MMHG

## 2020-09-20 VITALS
DIASTOLIC BLOOD PRESSURE: 68 MMHG | HEART RATE: 110 BPM | SYSTOLIC BLOOD PRESSURE: 130 MMHG | OXYGEN SATURATION: 94 % | RESPIRATION RATE: 17 BRPM

## 2020-09-20 VITALS
DIASTOLIC BLOOD PRESSURE: 65 MMHG | SYSTOLIC BLOOD PRESSURE: 114 MMHG | HEART RATE: 90 BPM | OXYGEN SATURATION: 93 % | RESPIRATION RATE: 19 BRPM

## 2020-09-20 VITALS
DIASTOLIC BLOOD PRESSURE: 68 MMHG | SYSTOLIC BLOOD PRESSURE: 122 MMHG | RESPIRATION RATE: 17 BRPM | HEART RATE: 95 BPM | OXYGEN SATURATION: 94 %

## 2020-09-20 VITALS
BODY MASS INDEX: 42.2 KG/M2 | DIASTOLIC BLOOD PRESSURE: 103 MMHG | BODY MASS INDEX: 43.3 KG/M2 | RESPIRATION RATE: 22 BRPM | BODY MASS INDEX: 42.7 KG/M2 | SYSTOLIC BLOOD PRESSURE: 148 MMHG | OXYGEN SATURATION: 92 % | BODY MASS INDEX: 39.5 KG/M2 | HEART RATE: 81 BPM | BODY MASS INDEX: 40.1 KG/M2 | BODY MASS INDEX: 39.9 KG/M2 | TEMPERATURE: 98.24 F

## 2020-09-20 VITALS
DIASTOLIC BLOOD PRESSURE: 72 MMHG | OXYGEN SATURATION: 93 % | RESPIRATION RATE: 17 BRPM | HEART RATE: 95 BPM | SYSTOLIC BLOOD PRESSURE: 126 MMHG

## 2020-09-20 DIAGNOSIS — F43.0: ICD-10-CM

## 2020-09-20 DIAGNOSIS — Z79.4: ICD-10-CM

## 2020-09-20 DIAGNOSIS — D46.9: ICD-10-CM

## 2020-09-20 DIAGNOSIS — I48.20: ICD-10-CM

## 2020-09-20 DIAGNOSIS — U07.1: ICD-10-CM

## 2020-09-20 DIAGNOSIS — F41.8: Primary | ICD-10-CM

## 2020-09-20 DIAGNOSIS — E11.9: ICD-10-CM

## 2020-09-20 LAB
ALBUMIN LEVEL: 3.4 G/DL (ref 3.5–5)
ALBUMIN/GLOB SERPL: 1.2 {RATIO} (ref 1.1–1.8)
ALP ISO SERPL-ACNC: 83 U/L (ref 38–126)
ALT SERPLBLD-CCNC: 40 U/L (ref 12–78)
ANION GAP SERPL CALC-SCNC: 8.1 MEQ/L (ref 5–15)
AST SERPL QL: 39 U/L (ref 17–59)
BILIRUBIN,TOTAL: 0.9 MG/DL (ref 0.2–1.3)
BUN SERPL-MCNC: 12 MG/DL (ref 9–20)
CALCIUM SPEC-MCNC: 9.1 MG/DL (ref 8.4–10.2)
CHLORIDE SPEC-SCNC: 109 MMOL/L (ref 98–107)
CO2 SERPL-SCNC: 24 MMOL/L (ref 22–30)
CREAT BLD-SCNC: 0.9 MG/DL (ref 0.66–1.25)
CREATININE CLEARANCE ESTIMATED: 170 ML/MIN (ref 50–200)
ESTIMATED GLOMERULAR FILT RATE: 86 ML/MIN (ref 60–?)
GFR (AFRICAN AMERICAN): 105 ML/MIN (ref 60–?)
GLOBULIN SER CALC-MCNC: 2.9 G/DL (ref 1.3–3.2)
GLUCOSE: 106 MG/DL (ref 74–100)
HCT VFR BLD CALC: 41.5 % (ref 42–52)
HGB BLD-MCNC: 14.1 G/DL (ref 14.1–18)
MCHC RBC-ENTMCNC: 33.9 G/DL (ref 31.8–35.4)
MCV RBC: 88.3 FL (ref 80–94)
MEAN CORPUSCULAR HEMOGLOBIN: 30 PG (ref 27–31.2)
PLATELET # BLD: 734 K/MM3 (ref 142–424)
POTASSIUM: 4.1 MMOL/L (ref 3.5–5.1)
PROT SERPL-MCNC: 6.3 G/DL (ref 6.3–8.2)
RBC # BLD AUTO: 4.7 M/MM3 (ref 4.6–6.2)
SODIUM SPEC-SCNC: 137 MMOL/L (ref 136–145)
TROPONIN I: < 0.01 NG/ML (ref 0–0.03)
WBC # BLD AUTO: 14.1 K/MM3 (ref 4.8–10.8)

## 2020-09-20 PROCEDURE — 96374 THER/PROPH/DIAG INJ IV PUSH: CPT

## 2020-09-20 PROCEDURE — 84484 ASSAY OF TROPONIN QUANT: CPT

## 2020-09-20 PROCEDURE — 82962 GLUCOSE BLOOD TEST: CPT

## 2020-09-20 PROCEDURE — 93005 ELECTROCARDIOGRAM TRACING: CPT

## 2020-09-20 PROCEDURE — G0378 HOSPITAL OBSERVATION PER HR: HCPCS

## 2020-09-20 PROCEDURE — 85025 COMPLETE CBC W/AUTO DIFF WBC: CPT

## 2020-09-20 PROCEDURE — 96376 TX/PRO/DX INJ SAME DRUG ADON: CPT

## 2020-09-20 PROCEDURE — 87507 IADNA-DNA/RNA PROBE TQ 12-25: CPT

## 2020-09-20 PROCEDURE — U0003 INFECTIOUS AGENT DETECTION BY NUCLEIC ACID (DNA OR RNA); SEVERE ACUTE RESPIRATORY SYNDROME CORONAVIRUS 2 (SARS-COV-2) (CORONAVIRUS DISEASE [COVID-19]), AMPLIFIED PROBE TECHNIQUE, MAKING USE OF HIGH THROUGHPUT TECHNOLOGIES AS DESCRIBED BY CMS-2020-01-R: HCPCS

## 2020-09-20 PROCEDURE — 71045 X-RAY EXAM CHEST 1 VIEW: CPT

## 2020-09-20 PROCEDURE — 80053 COMPREHEN METABOLIC PANEL: CPT

## 2020-09-20 PROCEDURE — 86328 IA NFCT AB SARSCOV2 COVID19: CPT

## 2020-09-20 PROCEDURE — 99285 EMERGENCY DEPT VISIT HI MDM: CPT

## 2020-09-20 PROCEDURE — 80048 BASIC METABOLIC PNL TOTAL CA: CPT

## 2020-09-21 VITALS
SYSTOLIC BLOOD PRESSURE: 131 MMHG | OXYGEN SATURATION: 90 % | DIASTOLIC BLOOD PRESSURE: 62 MMHG | HEART RATE: 70 BPM | TEMPERATURE: 97.88 F | RESPIRATION RATE: 18 BRPM

## 2020-09-21 VITALS
RESPIRATION RATE: 17 BRPM | OXYGEN SATURATION: 93 % | DIASTOLIC BLOOD PRESSURE: 51 MMHG | HEART RATE: 88 BPM | SYSTOLIC BLOOD PRESSURE: 107 MMHG | TEMPERATURE: 98.24 F

## 2020-09-21 VITALS
TEMPERATURE: 96.98 F | DIASTOLIC BLOOD PRESSURE: 73 MMHG | OXYGEN SATURATION: 94 % | HEART RATE: 75 BPM | SYSTOLIC BLOOD PRESSURE: 140 MMHG | RESPIRATION RATE: 21 BRPM

## 2020-09-21 VITALS
TEMPERATURE: 98.1 F | OXYGEN SATURATION: 95 % | DIASTOLIC BLOOD PRESSURE: 84 MMHG | HEART RATE: 61 BPM | SYSTOLIC BLOOD PRESSURE: 153 MMHG | RESPIRATION RATE: 20 BRPM

## 2020-09-21 VITALS
TEMPERATURE: 98.06 F | RESPIRATION RATE: 22 BRPM | OXYGEN SATURATION: 92 % | SYSTOLIC BLOOD PRESSURE: 105 MMHG | HEART RATE: 88 BPM | DIASTOLIC BLOOD PRESSURE: 51 MMHG

## 2020-09-21 VITALS
RESPIRATION RATE: 18 BRPM | HEART RATE: 90 BPM | OXYGEN SATURATION: 91 % | SYSTOLIC BLOOD PRESSURE: 135 MMHG | DIASTOLIC BLOOD PRESSURE: 81 MMHG | TEMPERATURE: 96.98 F

## 2020-09-21 VITALS
OXYGEN SATURATION: 98 % | TEMPERATURE: 98.06 F | SYSTOLIC BLOOD PRESSURE: 115 MMHG | DIASTOLIC BLOOD PRESSURE: 64 MMHG | HEART RATE: 74 BPM | RESPIRATION RATE: 16 BRPM

## 2020-09-21 VITALS — OXYGEN SATURATION: 94 %

## 2020-09-21 VITALS — HEART RATE: 72 BPM

## 2020-09-21 LAB
ANION GAP SERPL CALC-SCNC: 11.5 MEQ/L (ref 5–15)
BUN SERPL-MCNC: 11 MG/DL (ref 9–20)
CALCIUM SPEC-MCNC: 8.9 MG/DL (ref 8.4–10.2)
CHLORIDE SPEC-SCNC: 107 MMOL/L (ref 98–107)
CO2 SERPL-SCNC: 24 MMOL/L (ref 22–30)
CREAT BLD-SCNC: 1 MG/DL (ref 0.66–1.25)
CREATININE CLEARANCE ESTIMATED: 87 ML/MIN (ref 50–200)
ESTIMATED GLOMERULAR FILT RATE: 76 ML/MIN (ref 60–?)
GFR (AFRICAN AMERICAN): 93 ML/MIN (ref 60–?)
GLUCOSE: 94 MG/DL (ref 74–100)
HCT VFR BLD CALC: 42 % (ref 42–52)
HGB BLD-MCNC: 13.7 G/DL (ref 14.1–18)
MCHC RBC-ENTMCNC: 32.6 G/DL (ref 31.8–35.4)
MCV RBC: 90 FL (ref 80–94)
MEAN CORPUSCULAR HEMOGLOBIN: 29.4 PG (ref 27–31.2)
PLATELET # BLD: 741 K/MM3 (ref 142–424)
POTASSIUM: 4.5 MMOL/L (ref 3.5–5.1)
RBC # BLD AUTO: 4.67 M/MM3 (ref 4.6–6.2)
SODIUM SPEC-SCNC: 138 MMOL/L (ref 136–145)
WBC # BLD AUTO: 13.9 K/MM3 (ref 4.8–10.8)

## 2020-09-22 VITALS
DIASTOLIC BLOOD PRESSURE: 73 MMHG | OXYGEN SATURATION: 90 % | SYSTOLIC BLOOD PRESSURE: 104 MMHG | HEART RATE: 80 BPM | RESPIRATION RATE: 20 BRPM | TEMPERATURE: 97.52 F

## 2020-09-22 VITALS
SYSTOLIC BLOOD PRESSURE: 96 MMHG | HEART RATE: 69 BPM | DIASTOLIC BLOOD PRESSURE: 68 MMHG | TEMPERATURE: 97.8 F | RESPIRATION RATE: 18 BRPM | OXYGEN SATURATION: 93 %

## 2020-09-22 VITALS
SYSTOLIC BLOOD PRESSURE: 114 MMHG | OXYGEN SATURATION: 94 % | HEART RATE: 89 BPM | TEMPERATURE: 97.8 F | RESPIRATION RATE: 16 BRPM | DIASTOLIC BLOOD PRESSURE: 62 MMHG

## 2020-09-22 VITALS
DIASTOLIC BLOOD PRESSURE: 68 MMHG | SYSTOLIC BLOOD PRESSURE: 116 MMHG | TEMPERATURE: 97.88 F | OXYGEN SATURATION: 95 % | HEART RATE: 79 BPM | RESPIRATION RATE: 14 BRPM

## 2020-09-22 VITALS
DIASTOLIC BLOOD PRESSURE: 70 MMHG | HEART RATE: 99 BPM | TEMPERATURE: 97.5 F | SYSTOLIC BLOOD PRESSURE: 125 MMHG | RESPIRATION RATE: 18 BRPM | OXYGEN SATURATION: 94 %

## 2020-09-22 VITALS
RESPIRATION RATE: 14 BRPM | SYSTOLIC BLOOD PRESSURE: 103 MMHG | OXYGEN SATURATION: 92 % | HEART RATE: 90 BPM | DIASTOLIC BLOOD PRESSURE: 70 MMHG | TEMPERATURE: 96.98 F

## 2020-09-22 VITALS — HEART RATE: 90 BPM

## 2020-09-22 LAB
GLUCOSE BLDC GLUCOMTR-MCNC: 111 MG/DL (ref 70–110)
GLUCOSE BLDC GLUCOMTR-MCNC: 128 MG/DL (ref 70–110)
GLUCOSE BLDC GLUCOMTR-MCNC: 77 MG/DL (ref 70–110)
GLUCOSE BLDC GLUCOMTR-MCNC: 80 MG/DL (ref 70–110)
GLUCOSE BLDC GLUCOMTR-MCNC: 83 MG/DL (ref 70–110)

## 2020-09-23 VITALS — HEART RATE: 90 BPM

## 2020-09-23 VITALS
RESPIRATION RATE: 18 BRPM | OXYGEN SATURATION: 92 % | HEART RATE: 80 BPM | SYSTOLIC BLOOD PRESSURE: 93 MMHG | TEMPERATURE: 97.52 F | DIASTOLIC BLOOD PRESSURE: 63 MMHG

## 2020-09-23 VITALS
HEART RATE: 87 BPM | TEMPERATURE: 98.6 F | OXYGEN SATURATION: 93 % | RESPIRATION RATE: 18 BRPM | DIASTOLIC BLOOD PRESSURE: 75 MMHG | SYSTOLIC BLOOD PRESSURE: 121 MMHG

## 2020-09-23 VITALS
SYSTOLIC BLOOD PRESSURE: 117 MMHG | RESPIRATION RATE: 20 BRPM | HEART RATE: 87 BPM | TEMPERATURE: 97.8 F | OXYGEN SATURATION: 91 % | DIASTOLIC BLOOD PRESSURE: 63 MMHG

## 2020-09-23 VITALS — OXYGEN SATURATION: 93 %

## 2020-09-23 VITALS
HEART RATE: 90 BPM | RESPIRATION RATE: 16 BRPM | DIASTOLIC BLOOD PRESSURE: 73 MMHG | SYSTOLIC BLOOD PRESSURE: 102 MMHG | OXYGEN SATURATION: 93 %

## 2020-09-23 VITALS
DIASTOLIC BLOOD PRESSURE: 75 MMHG | RESPIRATION RATE: 16 BRPM | SYSTOLIC BLOOD PRESSURE: 121 MMHG | HEART RATE: 90 BPM | TEMPERATURE: 97.7 F

## 2020-09-23 VITALS
TEMPERATURE: 98 F | HEART RATE: 90 BPM | SYSTOLIC BLOOD PRESSURE: 103 MMHG | RESPIRATION RATE: 14 BRPM | OXYGEN SATURATION: 95 % | DIASTOLIC BLOOD PRESSURE: 63 MMHG

## 2020-09-23 VITALS
SYSTOLIC BLOOD PRESSURE: 99 MMHG | DIASTOLIC BLOOD PRESSURE: 51 MMHG | OXYGEN SATURATION: 95 % | TEMPERATURE: 97.1 F | HEART RATE: 108 BPM | RESPIRATION RATE: 20 BRPM

## 2020-09-23 LAB — GLUCOSE BLDC GLUCOMTR-MCNC: 91 MG/DL (ref 70–110)

## 2020-09-24 VITALS
SYSTOLIC BLOOD PRESSURE: 123 MMHG | RESPIRATION RATE: 20 BRPM | HEART RATE: 87 BPM | OXYGEN SATURATION: 96 % | DIASTOLIC BLOOD PRESSURE: 71 MMHG

## 2020-09-24 VITALS
OXYGEN SATURATION: 92 % | TEMPERATURE: 96.98 F | DIASTOLIC BLOOD PRESSURE: 58 MMHG | SYSTOLIC BLOOD PRESSURE: 100 MMHG | RESPIRATION RATE: 16 BRPM | HEART RATE: 72 BPM

## 2020-09-24 VITALS — HEART RATE: 70 BPM

## 2020-09-24 LAB — GLUCOSE BLDC GLUCOMTR-MCNC: 99 MG/DL (ref 70–110)

## 2020-09-25 LAB — GLUCOSE BLDC GLUCOMTR-MCNC: 77 MG/DL (ref 70–110)

## 2020-10-23 ENCOUNTER — HOSPITAL ENCOUNTER (OUTPATIENT)
Age: 60
End: 2020-10-23
Payer: COMMERCIAL

## 2020-10-23 DIAGNOSIS — D47.2: Primary | ICD-10-CM

## 2020-10-23 LAB
ALBUMIN LEVEL: 4.5 G/DL (ref 3.5–5)
ALBUMIN/GLOB SERPL: 1.7 {RATIO} (ref 1.1–1.8)
ALP ISO SERPL-ACNC: 133 U/L (ref 38–126)
ALT SERPLBLD-CCNC: 38 U/L (ref 12–78)
ANION GAP SERPL CALC-SCNC: 16.4 MEQ/L (ref 5–15)
AST SERPL QL: 46 U/L (ref 17–59)
BILIRUBIN,TOTAL: 0.7 MG/DL (ref 0.2–1.3)
BUN SERPL-MCNC: 13 MG/DL (ref 9–20)
CALCIUM SPEC-MCNC: 9.6 MG/DL (ref 8.4–10.2)
CELLS COUNTED: 100
CHLORIDE SPEC-SCNC: 101 MMOL/L (ref 98–107)
CO2 SERPL-SCNC: 26 MMOL/L (ref 22–30)
CREAT BLD-SCNC: 1.1 MG/DL (ref 0.66–1.25)
ESTIMATED GLOMERULAR FILT RATE: 69 ML/MIN (ref 60–?)
GFR (AFRICAN AMERICAN): 83 ML/MIN (ref 60–?)
GLOBULIN SER CALC-MCNC: 2.7 G/DL (ref 1.3–3.2)
GLUCOSE: 221 MG/DL (ref 74–100)
HCT VFR BLD CALC: 49.4 % (ref 42–52)
HGB BLD-MCNC: 16.4 G/DL (ref 14.1–18)
MANUAL DIFFERENTIAL: (no result)
MCHC RBC-ENTMCNC: 33.2 G/DL (ref 31.8–35.4)
MCV RBC: 88.5 FL (ref 80–94)
MEAN CORPUSCULAR HEMOGLOBIN: 29.4 PG (ref 27–31.2)
PLATELET # BLD: 457 K/MM3 (ref 142–424)
POTASSIUM: 4.4 MMOL/L (ref 3.5–5.1)
PROT SERPL-MCNC: 7.2 G/DL (ref 6.3–8.2)
RBC # BLD AUTO: 5.58 M/MM3 (ref 4.6–6.2)
SODIUM SPEC-SCNC: 139 MMOL/L (ref 136–145)
WBC # BLD AUTO: 12 K/MM3 (ref 4.8–10.8)

## 2020-10-23 PROCEDURE — 86334 IMMUNOFIX E-PHORESIS SERUM: CPT

## 2020-10-23 PROCEDURE — 84165 PROTEIN E-PHORESIS SERUM: CPT

## 2020-10-23 PROCEDURE — 84155 ASSAY OF PROTEIN SERUM: CPT

## 2020-10-23 PROCEDURE — 82784 ASSAY IGA/IGD/IGG/IGM EACH: CPT

## 2020-10-23 PROCEDURE — 80053 COMPREHEN METABOLIC PANEL: CPT

## 2020-10-23 PROCEDURE — 36415 COLL VENOUS BLD VENIPUNCTURE: CPT

## 2020-10-23 PROCEDURE — 83615 LACTATE (LD) (LDH) ENZYME: CPT

## 2020-10-23 PROCEDURE — 83883 ASSAY NEPHELOMETRY NOT SPEC: CPT

## 2020-10-23 PROCEDURE — 85007 BL SMEAR W/DIFF WBC COUNT: CPT

## 2020-10-23 PROCEDURE — 85025 COMPLETE CBC W/AUTO DIFF WBC: CPT

## 2020-10-26 LAB
ALBUMIN SERPL ELPH-MCNC: 3.9 G/DL (ref 2.9–4.4)
ALPHA1 GLOB SERPL ELPH-MCNC: 0.2 G/DL (ref 0–0.4)
ALPHA2 GLOB SERPL ELPH-MCNC: 1.1 G/DL (ref 0.4–1)
GAMMA GLOBULIN: 1 G/DL (ref 0.4–1.8)
IGA SERPL-MCNC: 54 MG/DL (ref 90–386)
IGG SERPL-MCNC: 708 MG/DL (ref 603–1613)
IGM SERPL-MCNC: 352 MG/DL (ref 20–172)

## 2020-10-29 LAB — LABORATORY REPORT: (no result)

## 2020-12-03 ENCOUNTER — HOSPITAL ENCOUNTER (OUTPATIENT)
Age: 60
End: 2020-12-03
Payer: COMMERCIAL

## 2020-12-03 DIAGNOSIS — E66.9: ICD-10-CM

## 2020-12-03 DIAGNOSIS — E78.1: ICD-10-CM

## 2020-12-03 DIAGNOSIS — Z79.4: ICD-10-CM

## 2020-12-03 DIAGNOSIS — K74.60: Primary | ICD-10-CM

## 2020-12-03 DIAGNOSIS — E11.65: ICD-10-CM

## 2020-12-03 LAB
ALBUMIN LEVEL: 4.3 G/DL (ref 3.5–5)
ALBUMIN/GLOB SERPL: 1.7 {RATIO} (ref 1.1–1.8)
ALP ISO SERPL-ACNC: 181 U/L (ref 38–126)
ALT SERPLBLD-CCNC: 35 U/L (ref 12–78)
ANION GAP SERPL CALC-SCNC: 15.7 MEQ/L (ref 5–15)
AST SERPL QL: 44 U/L (ref 17–59)
BILIRUBIN,TOTAL: 0.8 MG/DL (ref 0.2–1.3)
BUN SERPL-MCNC: 16 MG/DL (ref 9–20)
CALCIUM SPEC-MCNC: 9.6 MG/DL (ref 8.4–10.2)
CHLORIDE SPEC-SCNC: 102 MMOL/L (ref 98–107)
CO2 SERPL-SCNC: 21 MMOL/L (ref 22–30)
CREAT BLD-SCNC: 1 MG/DL (ref 0.66–1.25)
ESTIMATED GLOMERULAR FILT RATE: 76 ML/MIN (ref 60–?)
GFR (AFRICAN AMERICAN): 93 ML/MIN (ref 60–?)
GLOBULIN SER CALC-MCNC: 2.6 G/DL (ref 1.3–3.2)
GLUCOSE: 297 MG/DL (ref 74–100)
HCT VFR BLD CALC: 48.8 % (ref 42–52)
HGB BLD-MCNC: 16 G/DL (ref 14.1–18)
INR PPP: 1.25 (ref 0.9–1.1)
MCHC RBC-ENTMCNC: 32.8 G/DL (ref 31.8–35.4)
MCV RBC: 89.6 FL (ref 80–94)
MEAN CORPUSCULAR HEMOGLOBIN: 29.4 PG (ref 27–31.2)
PLATELET # BLD: 501 K/MM3 (ref 142–424)
POTASSIUM: 4.7 MMOL/L (ref 3.5–5.1)
PROT SERPL-MCNC: 6.9 G/DL (ref 6.3–8.2)
PT BLD: 13.6 SECONDS (ref 9.4–11.8)
RBC # BLD AUTO: 5.44 M/MM3 (ref 4.6–6.2)
SODIUM SPEC-SCNC: 134 MMOL/L (ref 136–145)
WBC # BLD AUTO: 12.7 K/MM3 (ref 4.8–10.8)

## 2020-12-03 PROCEDURE — 85610 PROTHROMBIN TIME: CPT

## 2020-12-03 PROCEDURE — 82272 OCCULT BLD FECES 1-3 TESTS: CPT

## 2020-12-03 PROCEDURE — 36415 COLL VENOUS BLD VENIPUNCTURE: CPT

## 2020-12-03 PROCEDURE — 80053 COMPREHEN METABOLIC PANEL: CPT

## 2020-12-03 PROCEDURE — 82105 ALPHA-FETOPROTEIN SERUM: CPT

## 2020-12-03 PROCEDURE — 85025 COMPLETE CBC W/AUTO DIFF WBC: CPT

## 2020-12-03 PROCEDURE — G0328 FECAL BLOOD SCRN IMMUNOASSAY: HCPCS

## 2020-12-23 RX ORDER — DULAGLUTIDE 1.5 MG/.5ML
1.5 INJECTION, SOLUTION SUBCUTANEOUS WEEKLY
Qty: 2 ML | Refills: 2 | Status: SHIPPED | OUTPATIENT
Start: 2020-12-23 | End: 2021-01-25 | Stop reason: SDUPTHER

## 2021-01-25 ENCOUNTER — OFFICE VISIT (OUTPATIENT)
Dept: ENDOCRINOLOGY | Facility: CLINIC | Age: 61
End: 2021-01-25

## 2021-01-25 DIAGNOSIS — E11.65 TYPE 2 DIABETES MELLITUS WITH HYPERGLYCEMIA, WITH LONG-TERM CURRENT USE OF INSULIN (HCC): Primary | Chronic | ICD-10-CM

## 2021-01-25 DIAGNOSIS — E78.1 HYPERTRIGLYCERIDEMIA: Chronic | ICD-10-CM

## 2021-01-25 DIAGNOSIS — Z79.4 TYPE 2 DIABETES MELLITUS WITH HYPERGLYCEMIA, WITH LONG-TERM CURRENT USE OF INSULIN (HCC): Primary | Chronic | ICD-10-CM

## 2021-01-25 PROCEDURE — 99443 PR PHYS/QHP TELEPHONE EVALUATION 21-30 MIN: CPT | Performed by: PHYSICIAN ASSISTANT

## 2021-01-25 RX ORDER — QUETIAPINE FUMARATE 25 MG/1
25 TABLET, FILM COATED ORAL
COMMUNITY
Start: 2021-01-12

## 2021-01-25 RX ORDER — LANCETS
EACH MISCELLANEOUS DAILY
COMMUNITY
Start: 2020-11-18 | End: 2021-10-06 | Stop reason: SDUPTHER

## 2021-01-25 RX ORDER — MULTIPLE VITAMINS W/ MINERALS TAB 9MG-400MCG
1 TAB ORAL DAILY
COMMUNITY

## 2021-01-25 RX ORDER — INSULIN DEGLUDEC 200 U/ML
80 INJECTION, SOLUTION SUBCUTANEOUS 2 TIMES DAILY
COMMUNITY
End: 2021-01-25

## 2021-01-25 RX ORDER — BLOOD SUGAR DIAGNOSTIC
STRIP MISCELLANEOUS
COMMUNITY
Start: 2020-11-18 | End: 2021-01-25 | Stop reason: SDUPTHER

## 2021-01-25 RX ORDER — INSULIN ASPART 100 [IU]/ML
80 INJECTION, SUSPENSION SUBCUTANEOUS
Qty: 150 ML | Refills: 1 | Status: SHIPPED | OUTPATIENT
Start: 2021-01-25 | End: 2021-10-06 | Stop reason: SDUPTHER

## 2021-01-25 RX ORDER — BLOOD SUGAR DIAGNOSTIC
STRIP MISCELLANEOUS
Qty: 200 EACH | Refills: 3 | Status: SHIPPED | OUTPATIENT
Start: 2021-01-25 | End: 2021-11-04 | Stop reason: SDUPTHER

## 2021-01-25 RX ORDER — DULAGLUTIDE 1.5 MG/.5ML
1.5 INJECTION, SOLUTION SUBCUTANEOUS WEEKLY
Qty: 2 ML | Refills: 5 | Status: SHIPPED | OUTPATIENT
Start: 2021-01-25 | End: 2021-11-17

## 2021-01-25 RX ORDER — LORAZEPAM 0.5 MG/1
0.5 TABLET ORAL 2 TIMES DAILY PRN
COMMUNITY
Start: 2020-11-23

## 2021-01-29 ENCOUNTER — HOSPITAL ENCOUNTER (OUTPATIENT)
Age: 61
End: 2021-01-29
Payer: COMMERCIAL

## 2021-01-29 DIAGNOSIS — Z12.11: ICD-10-CM

## 2021-01-29 DIAGNOSIS — E11.65: ICD-10-CM

## 2021-01-29 DIAGNOSIS — Z01.812: Primary | ICD-10-CM

## 2021-01-29 DIAGNOSIS — Z20.822: ICD-10-CM

## 2021-01-29 DIAGNOSIS — R19.5: ICD-10-CM

## 2021-01-29 DIAGNOSIS — Z86.16: ICD-10-CM

## 2021-01-29 DIAGNOSIS — Z79.4: ICD-10-CM

## 2021-01-29 LAB
ALBUMIN LEVEL: 4.8 G/DL (ref 3.5–5)
ALBUMIN/GLOB SERPL: 1.7 {RATIO} (ref 1.1–1.8)
ALP ISO SERPL-ACNC: 123 U/L (ref 38–126)
ALT SERPLBLD-CCNC: 36 U/L (ref 12–78)
ANION GAP SERPL CALC-SCNC: 16.4 MEQ/L (ref 5–15)
AST SERPL QL: 47 U/L (ref 17–59)
BILIRUBIN,TOTAL: 0.4 MG/DL (ref 0.2–1.3)
BUN SERPL-MCNC: 19 MG/DL (ref 9–20)
CALCIUM SPEC-MCNC: 10 MG/DL (ref 8.4–10.2)
CHLORIDE SPEC-SCNC: 101 MMOL/L (ref 98–107)
CO2 SERPL-SCNC: 25 MMOL/L (ref 22–30)
CREAT BLD-SCNC: 1.1 MG/DL (ref 0.66–1.25)
ESTIMATED GLOMERULAR FILT RATE: 69 ML/MIN (ref 60–?)
GFR (AFRICAN AMERICAN): 83 ML/MIN (ref 60–?)
GLOBULIN SER CALC-MCNC: 2.9 G/DL (ref 1.3–3.2)
GLUCOSE: 156 MG/DL (ref 74–100)
POTASSIUM: 4.4 MMOL/L (ref 3.5–5.1)
PROT SERPL-MCNC: 7.7 G/DL (ref 6.3–8.2)
SODIUM SPEC-SCNC: 138 MMOL/L (ref 136–145)
TSH SERPL-ACNC: 0.53 UIU/ML (ref 0.47–4.68)

## 2021-01-29 PROCEDURE — 82570 ASSAY OF URINE CREATININE: CPT

## 2021-01-29 PROCEDURE — 82043 UR ALBUMIN QUANTITATIVE: CPT

## 2021-01-29 PROCEDURE — 84443 ASSAY THYROID STIM HORMONE: CPT

## 2021-01-29 PROCEDURE — 36415 COLL VENOUS BLD VENIPUNCTURE: CPT

## 2021-01-29 PROCEDURE — 80053 COMPREHEN METABOLIC PANEL: CPT

## 2021-01-29 PROCEDURE — 86328 IA NFCT AB SARSCOV2 COVID19: CPT

## 2021-02-01 ENCOUNTER — ON CAMPUS - OUTPATIENT (OUTPATIENT)
Dept: RURAL HOSPITAL 6 | Facility: HOSPITAL | Age: 61
End: 2021-02-01

## 2021-02-01 ENCOUNTER — HOSPITAL ENCOUNTER (OUTPATIENT)
Age: 61
Discharge: HOME | End: 2021-02-01
Payer: COMMERCIAL

## 2021-02-01 VITALS
SYSTOLIC BLOOD PRESSURE: 148 MMHG | OXYGEN SATURATION: 94 % | DIASTOLIC BLOOD PRESSURE: 88 MMHG | HEART RATE: 89 BPM | RESPIRATION RATE: 18 BRPM

## 2021-02-01 VITALS
OXYGEN SATURATION: 95 % | RESPIRATION RATE: 18 BRPM | HEART RATE: 88 BPM | DIASTOLIC BLOOD PRESSURE: 59 MMHG | SYSTOLIC BLOOD PRESSURE: 121 MMHG

## 2021-02-01 VITALS
HEART RATE: 83 BPM | SYSTOLIC BLOOD PRESSURE: 125 MMHG | DIASTOLIC BLOOD PRESSURE: 84 MMHG | RESPIRATION RATE: 18 BRPM | OXYGEN SATURATION: 94 %

## 2021-02-01 VITALS
SYSTOLIC BLOOD PRESSURE: 135 MMHG | TEMPERATURE: 97 F | HEART RATE: 102 BPM | OXYGEN SATURATION: 95 % | RESPIRATION RATE: 18 BRPM | DIASTOLIC BLOOD PRESSURE: 84 MMHG

## 2021-02-01 VITALS
DIASTOLIC BLOOD PRESSURE: 73 MMHG | TEMPERATURE: 97.88 F | OXYGEN SATURATION: 92 % | HEART RATE: 91 BPM | SYSTOLIC BLOOD PRESSURE: 118 MMHG | RESPIRATION RATE: 18 BRPM

## 2021-02-01 VITALS
DIASTOLIC BLOOD PRESSURE: 58 MMHG | RESPIRATION RATE: 18 BRPM | OXYGEN SATURATION: 94 % | SYSTOLIC BLOOD PRESSURE: 109 MMHG | HEART RATE: 94 BPM

## 2021-02-01 VITALS — OXYGEN SATURATION: 97 %

## 2021-02-01 VITALS — BODY MASS INDEX: 47.2 KG/M2

## 2021-02-01 DIAGNOSIS — K31.9: ICD-10-CM

## 2021-02-01 DIAGNOSIS — K44.9: ICD-10-CM

## 2021-02-01 DIAGNOSIS — K29.50 UNSPECIFIED CHRONIC GASTRITIS WITHOUT BLEEDING: ICD-10-CM

## 2021-02-01 DIAGNOSIS — D50.0 IRON DEFICIENCY ANEMIA SECONDARY TO BLOOD LOSS (CHRONIC): ICD-10-CM

## 2021-02-01 DIAGNOSIS — K31.819: ICD-10-CM

## 2021-02-01 DIAGNOSIS — K21.9: ICD-10-CM

## 2021-02-01 DIAGNOSIS — K31.819 ANGIODYSPLASIA OF STOMACH AND DUODENUM WITHOUT BLEEDING: ICD-10-CM

## 2021-02-01 DIAGNOSIS — Z86.010 PERSONAL HISTORY OF COLONIC POLYPS: ICD-10-CM

## 2021-02-01 DIAGNOSIS — K62.5 HEMORRHAGE OF ANUS AND RECTUM: ICD-10-CM

## 2021-02-01 DIAGNOSIS — D12.5 BENIGN NEOPLASM OF SIGMOID COLON: ICD-10-CM

## 2021-02-01 DIAGNOSIS — K31.7 POLYP OF STOMACH AND DUODENUM: ICD-10-CM

## 2021-02-01 DIAGNOSIS — K75.81: ICD-10-CM

## 2021-02-01 DIAGNOSIS — K57.90 DIVERTICULOSIS OF INTESTINE, PART UNSPECIFIED, WITHOUT PERFO: ICD-10-CM

## 2021-02-01 DIAGNOSIS — K64.8 OTHER HEMORRHOIDS: ICD-10-CM

## 2021-02-01 DIAGNOSIS — Z87.19: ICD-10-CM

## 2021-02-01 DIAGNOSIS — I85.10: ICD-10-CM

## 2021-02-01 DIAGNOSIS — K63.5: Primary | ICD-10-CM

## 2021-02-01 DIAGNOSIS — Z86.010: ICD-10-CM

## 2021-02-01 DIAGNOSIS — Z90.81: ICD-10-CM

## 2021-02-01 DIAGNOSIS — K22.70 BARRETT'S ESOPHAGUS WITHOUT DYSPLASIA: ICD-10-CM

## 2021-02-01 DIAGNOSIS — K21.9 GASTRO-ESOPHAGEAL REFLUX DISEASE WITHOUT ESOPHAGITIS: ICD-10-CM

## 2021-02-01 DIAGNOSIS — K64.1: ICD-10-CM

## 2021-02-01 DIAGNOSIS — D12.0 BENIGN NEOPLASM OF CECUM: ICD-10-CM

## 2021-02-01 DIAGNOSIS — K57.30: ICD-10-CM

## 2021-02-01 DIAGNOSIS — Z79.84: ICD-10-CM

## 2021-02-01 LAB — GLUCOSE BLDC GLUCOMTR-MCNC: 136 MG/DL (ref 70–110)

## 2021-02-01 PROCEDURE — 0DJ08ZZ INSPECTION OF UPPER INTESTINAL TRACT, VIA NATURAL OR ARTIFICIAL OPENING ENDOSCOPIC: ICD-10-PCS | Performed by: INTERNAL MEDICINE

## 2021-02-01 PROCEDURE — 45385 COLONOSCOPY W/LESION REMOVAL: CPT

## 2021-02-01 PROCEDURE — 43239 EGD BIOPSY SINGLE/MULTIPLE: CPT

## 2021-02-01 PROCEDURE — 45385 COLONOSCOPY W/LESION REMOVAL: CPT | Performed by: INTERNAL MEDICINE

## 2021-02-01 PROCEDURE — 45398 COLONOSCOPY W/BAND LIGATION: CPT | Mod: 59 | Performed by: INTERNAL MEDICINE

## 2021-02-01 PROCEDURE — 43239 EGD BIOPSY SINGLE/MULTIPLE: CPT | Performed by: INTERNAL MEDICINE

## 2021-02-01 PROCEDURE — 45398 COLONOSCOPY W/BAND LIGATION: CPT

## 2021-02-01 PROCEDURE — 82962 GLUCOSE BLOOD TEST: CPT

## 2021-02-02 DIAGNOSIS — E78.1 HYPERTRIGLYCERIDEMIA: Chronic | ICD-10-CM

## 2021-02-02 DIAGNOSIS — Z79.4 TYPE 2 DIABETES MELLITUS WITH HYPERGLYCEMIA, WITH LONG-TERM CURRENT USE OF INSULIN (HCC): Chronic | ICD-10-CM

## 2021-02-02 DIAGNOSIS — E11.65 TYPE 2 DIABETES MELLITUS WITH HYPERGLYCEMIA, WITH LONG-TERM CURRENT USE OF INSULIN (HCC): Chronic | ICD-10-CM

## 2021-02-03 ENCOUNTER — TELEPHONE (OUTPATIENT)
Dept: ENDOCRINOLOGY | Facility: CLINIC | Age: 61
End: 2021-02-03

## 2021-02-27 ENCOUNTER — HOSPITAL ENCOUNTER (OUTPATIENT)
Age: 61
End: 2021-02-27
Payer: COMMERCIAL

## 2021-02-27 DIAGNOSIS — D64.9: Primary | ICD-10-CM

## 2021-02-27 PROCEDURE — 82272 OCCULT BLD FECES 1-3 TESTS: CPT

## 2021-02-27 PROCEDURE — G0328 FECAL BLOOD SCRN IMMUNOASSAY: HCPCS

## 2021-02-28 ENCOUNTER — HOSPITAL ENCOUNTER (OUTPATIENT)
Age: 61
End: 2021-02-28
Payer: COMMERCIAL

## 2021-02-28 DIAGNOSIS — D64.9: Primary | ICD-10-CM

## 2021-02-28 PROCEDURE — G0328 FECAL BLOOD SCRN IMMUNOASSAY: HCPCS

## 2021-02-28 PROCEDURE — 82272 OCCULT BLD FECES 1-3 TESTS: CPT

## 2021-03-01 ENCOUNTER — HOSPITAL ENCOUNTER (OUTPATIENT)
Age: 61
End: 2021-03-01
Payer: COMMERCIAL

## 2021-03-01 DIAGNOSIS — D64.9: Primary | ICD-10-CM

## 2021-03-01 PROCEDURE — 82272 OCCULT BLD FECES 1-3 TESTS: CPT

## 2021-03-01 PROCEDURE — G0328 FECAL BLOOD SCRN IMMUNOASSAY: HCPCS

## 2021-03-19 ENCOUNTER — HOSPITAL ENCOUNTER (OUTPATIENT)
Age: 61
End: 2021-03-19
Payer: COMMERCIAL

## 2021-03-19 DIAGNOSIS — D72.819: Primary | ICD-10-CM

## 2021-03-19 DIAGNOSIS — D69.6: ICD-10-CM

## 2021-03-19 LAB
ALBUMIN LEVEL: 4.5 G/DL (ref 3.5–5)
ALBUMIN/GLOB SERPL: 1.7 {RATIO} (ref 1.1–1.8)
ALP ISO SERPL-ACNC: 114 U/L (ref 38–126)
ALT SERPLBLD-CCNC: 32 U/L (ref 12–78)
ANION GAP SERPL CALC-SCNC: 15.6 MEQ/L (ref 5–15)
AST SERPL QL: 46 U/L (ref 17–59)
BILIRUBIN,TOTAL: 0.4 MG/DL (ref 0.2–1.3)
BUN SERPL-MCNC: 18 MG/DL (ref 9–20)
CALCIUM SPEC-MCNC: 9.5 MG/DL (ref 8.4–10.2)
CHLORIDE SPEC-SCNC: 106 MMOL/L (ref 98–107)
CO2 SERPL-SCNC: 23 MMOL/L (ref 22–30)
CREAT BLD-SCNC: 1 MG/DL (ref 0.66–1.25)
ESTIMATED GLOMERULAR FILT RATE: 76 ML/MIN (ref 60–?)
GFR (AFRICAN AMERICAN): 93 ML/MIN (ref 60–?)
GLOBULIN SER CALC-MCNC: 2.7 G/DL (ref 1.3–3.2)
GLUCOSE: 189 MG/DL (ref 74–100)
HCT VFR BLD CALC: 45.6 % (ref 42–52)
HGB BLD-MCNC: 14.9 G/DL (ref 14.1–18)
MCHC RBC-ENTMCNC: 32.6 G/DL (ref 31.8–35.4)
MCV RBC: 87.2 FL (ref 80–94)
MEAN CORPUSCULAR HEMOGLOBIN: 28.4 PG (ref 27–31.2)
PLATELET # BLD: 443 K/MM3 (ref 142–424)
POTASSIUM: 4.6 MMOL/L (ref 3.5–5.1)
PROT SERPL-MCNC: 7.2 G/DL (ref 6.3–8.2)
RBC # BLD AUTO: 5.23 M/MM3 (ref 4.6–6.2)
SODIUM SPEC-SCNC: 140 MMOL/L (ref 136–145)
WBC # BLD AUTO: 11.3 K/MM3 (ref 4.8–10.8)

## 2021-03-19 PROCEDURE — 82784 ASSAY IGA/IGD/IGG/IGM EACH: CPT

## 2021-03-19 PROCEDURE — 80053 COMPREHEN METABOLIC PANEL: CPT

## 2021-03-19 PROCEDURE — 86334 IMMUNOFIX E-PHORESIS SERUM: CPT

## 2021-03-19 PROCEDURE — 83883 ASSAY NEPHELOMETRY NOT SPEC: CPT

## 2021-03-19 PROCEDURE — 84155 ASSAY OF PROTEIN SERUM: CPT

## 2021-03-19 PROCEDURE — 85025 COMPLETE CBC W/AUTO DIFF WBC: CPT

## 2021-03-19 PROCEDURE — 84165 PROTEIN E-PHORESIS SERUM: CPT

## 2021-03-19 PROCEDURE — 36415 COLL VENOUS BLD VENIPUNCTURE: CPT

## 2021-03-22 LAB
ALBUMIN SERPL ELPH-MCNC: 3.7 G/DL (ref 2.9–4.4)
ALPHA1 GLOB SERPL ELPH-MCNC: 0.2 G/DL (ref 0–0.4)
ALPHA2 GLOB SERPL ELPH-MCNC: 0.8 G/DL (ref 0.4–1)
GAMMA GLOBULIN: 1 G/DL (ref 0.4–1.8)
IGA SERPL-MCNC: 48 MG/DL (ref 90–386)
IGG SERPL-MCNC: 706 MG/DL (ref 603–1613)
IGM SERPL-MCNC: 344 MG/DL (ref 20–172)

## 2021-06-16 ENCOUNTER — HOSPITAL ENCOUNTER (OUTPATIENT)
Age: 61
End: 2021-06-16
Payer: COMMERCIAL

## 2021-06-16 DIAGNOSIS — C90.00: Primary | ICD-10-CM

## 2021-06-16 LAB
ALBUMIN LEVEL: 3.9 G/DL (ref 3.5–5)
ALBUMIN/GLOB SERPL: 1.4 {RATIO} (ref 1.1–1.8)
ALP ISO SERPL-ACNC: 128 U/L (ref 38–126)
ALT SERPLBLD-CCNC: 30 U/L (ref 12–78)
ANION GAP SERPL CALC-SCNC: 12.3 MEQ/L (ref 5–15)
AST SERPL QL: 35 U/L (ref 17–59)
BILIRUBIN,TOTAL: 0.5 MG/DL (ref 0.2–1.3)
BUN SERPL-MCNC: 18 MG/DL (ref 9–20)
CALCIUM SPEC-MCNC: 8.9 MG/DL (ref 8.4–10.2)
CHLORIDE SPEC-SCNC: 105 MMOL/L (ref 98–107)
CO2 SERPL-SCNC: 24 MMOL/L (ref 22–30)
CREAT BLD-SCNC: 0.9 MG/DL (ref 0.66–1.25)
ESTIMATED GLOMERULAR FILT RATE: 86 ML/MIN (ref 60–?)
GFR (AFRICAN AMERICAN): 104 ML/MIN (ref 60–?)
GLOBULIN SER CALC-MCNC: 2.8 G/DL (ref 1.3–3.2)
GLUCOSE: 203 MG/DL (ref 74–100)
HCT VFR BLD CALC: 43.7 % (ref 42–52)
HGB BLD-MCNC: 15.1 G/DL (ref 14.1–18)
MCHC RBC-ENTMCNC: 34.6 G/DL (ref 31.8–35.4)
MCV RBC: 85.5 FL (ref 80–94)
MEAN CORPUSCULAR HEMOGLOBIN: 29.6 PG (ref 27–31.2)
PLATELET # BLD: 423 K/MM3 (ref 142–424)
POTASSIUM: 4.3 MMOL/L (ref 3.5–5.1)
PROT SERPL-MCNC: 6.7 G/DL (ref 6.3–8.2)
RBC # BLD AUTO: 5.1 M/MM3 (ref 4.6–6.2)
SODIUM SPEC-SCNC: 137 MMOL/L (ref 136–145)
WBC # BLD AUTO: 11.4 K/MM3 (ref 4.8–10.8)

## 2021-06-16 PROCEDURE — 80053 COMPREHEN METABOLIC PANEL: CPT

## 2021-06-16 PROCEDURE — 83883 ASSAY NEPHELOMETRY NOT SPEC: CPT

## 2021-06-16 PROCEDURE — 36415 COLL VENOUS BLD VENIPUNCTURE: CPT

## 2021-06-16 PROCEDURE — 86334 IMMUNOFIX E-PHORESIS SERUM: CPT

## 2021-06-16 PROCEDURE — 84155 ASSAY OF PROTEIN SERUM: CPT

## 2021-06-16 PROCEDURE — 84165 PROTEIN E-PHORESIS SERUM: CPT

## 2021-06-16 PROCEDURE — 85025 COMPLETE CBC W/AUTO DIFF WBC: CPT

## 2021-06-16 PROCEDURE — 82784 ASSAY IGA/IGD/IGG/IGM EACH: CPT

## 2021-06-17 LAB
ALBUMIN SERPL ELPH-MCNC: 3.7 G/DL (ref 2.9–4.4)
ALPHA1 GLOB SERPL ELPH-MCNC: 0.2 G/DL (ref 0–0.4)
ALPHA2 GLOB SERPL ELPH-MCNC: 0.8 G/DL (ref 0.4–1)
GAMMA GLOBULIN: 0.9 G/DL (ref 0.4–1.8)

## 2021-06-18 LAB
IGA SERPL-MCNC: 79 MG/DL (ref 90–386)
IGG SERPL-MCNC: 688 MG/DL (ref 603–1613)
IGM SERPL-MCNC: 375 MG/DL (ref 20–172)

## 2021-08-09 ENCOUNTER — HOSPITAL ENCOUNTER (EMERGENCY)
Age: 61
Discharge: HOME | End: 2021-08-09
Payer: COMMERCIAL

## 2021-08-09 VITALS
DIASTOLIC BLOOD PRESSURE: 76 MMHG | OXYGEN SATURATION: 95 % | HEART RATE: 94 BPM | SYSTOLIC BLOOD PRESSURE: 135 MMHG | TEMPERATURE: 98.2 F | RESPIRATION RATE: 18 BRPM

## 2021-08-09 VITALS
OXYGEN SATURATION: 96 % | HEART RATE: 94 BPM | BODY MASS INDEX: 47.5 KG/M2 | TEMPERATURE: 98.96 F | RESPIRATION RATE: 20 BRPM | SYSTOLIC BLOOD PRESSURE: 149 MMHG | DIASTOLIC BLOOD PRESSURE: 85 MMHG

## 2021-08-09 DIAGNOSIS — I10: ICD-10-CM

## 2021-08-09 DIAGNOSIS — M54.41: Primary | ICD-10-CM

## 2021-08-09 DIAGNOSIS — I48.0: ICD-10-CM

## 2021-08-09 DIAGNOSIS — E11.9: ICD-10-CM

## 2021-08-09 DIAGNOSIS — Z86.16: ICD-10-CM
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
## 2021-08-09 DIAGNOSIS — E78.5: ICD-10-CM
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
## 2021-08-09 PROCEDURE — 99282 EMERGENCY DEPT VISIT SF MDM: CPT

## 2021-08-09 PROCEDURE — 96372 THER/PROPH/DIAG INJ SC/IM: CPT

## 2021-08-09 PROCEDURE — 72100 X-RAY EXAM L-S SPINE 2/3 VWS: CPT

## 2021-10-06 RX ORDER — INSULIN ASPART 100 [IU]/ML
80 INJECTION, SUSPENSION SUBCUTANEOUS
Qty: 150 ML | Refills: 0 | Status: SHIPPED | OUTPATIENT
Start: 2021-10-06 | End: 2021-11-08 | Stop reason: SDUPTHER

## 2021-10-06 RX ORDER — LANCETS
1 EACH MISCELLANEOUS DAILY
Qty: 100 EACH | Refills: 0 | Status: SHIPPED | OUTPATIENT
Start: 2021-10-06

## 2021-10-08 ENCOUNTER — HOSPITAL ENCOUNTER (OUTPATIENT)
Age: 61
End: 2021-10-08
Payer: COMMERCIAL

## 2021-10-08 DIAGNOSIS — C90.00: Primary | ICD-10-CM

## 2021-10-08 LAB
ALBUMIN LEVEL: 3.6 G/DL (ref 3.5–5)
ALBUMIN/GLOB SERPL: 1.3 {RATIO} (ref 1.1–1.8)
ALP ISO SERPL-ACNC: 169 U/L (ref 38–126)
ALT SERPLBLD-CCNC: 31 U/L (ref 12–78)
ANION GAP SERPL CALC-SCNC: 12.4 MEQ/L (ref 5–15)
AST SERPL QL: 39 U/L (ref 17–59)
BILIRUBIN,TOTAL: 0.6 MG/DL (ref 0.2–1.3)
BUN SERPL-MCNC: 13 MG/DL (ref 9–20)
CALCIUM SPEC-MCNC: 9.1 MG/DL (ref 8.4–10.2)
CELLS COUNTED: 100
CHLORIDE SPEC-SCNC: 104 MMOL/L (ref 98–107)
CO2 SERPL-SCNC: 24 MMOL/L (ref 22–30)
CREAT BLD-SCNC: 0.8 MG/DL (ref 0.66–1.25)
ESTIMATED GLOMERULAR FILT RATE: 99 ML/MIN (ref 60–?)
GFR (AFRICAN AMERICAN): 119 ML/MIN (ref 60–?)
GLOBULIN SER CALC-MCNC: 2.8 G/DL (ref 1.3–3.2)
GLUCOSE: 325 MG/DL (ref 74–100)
HCT VFR BLD CALC: 46.2 % (ref 42–52)
HGB BLD-MCNC: 15.2 G/DL (ref 14.1–18)
MANUAL DIFFERENTIAL: (no result)
MCHC RBC-ENTMCNC: 32.9 G/DL (ref 31.8–35.4)
MCV RBC: 89.6 FL (ref 80–94)
MEAN CORPUSCULAR HEMOGLOBIN: 29.5 PG (ref 27–31.2)
PLATELET # BLD: 438 K/MM3 (ref 142–424)
POTASSIUM: 4.4 MMOL/L (ref 3.5–5.1)
PROT SERPL-MCNC: 6.4 G/DL (ref 6.3–8.2)
RBC # BLD AUTO: 5.15 M/MM3 (ref 4.6–6.2)
SODIUM SPEC-SCNC: 136 MMOL/L (ref 136–145)
WBC # BLD AUTO: 9.7 K/MM3 (ref 4.8–10.8)

## 2021-10-08 PROCEDURE — 86334 IMMUNOFIX E-PHORESIS SERUM: CPT

## 2021-10-08 PROCEDURE — 36415 COLL VENOUS BLD VENIPUNCTURE: CPT

## 2021-10-08 PROCEDURE — 85007 BL SMEAR W/DIFF WBC COUNT: CPT

## 2021-10-08 PROCEDURE — 85025 COMPLETE CBC W/AUTO DIFF WBC: CPT

## 2021-10-08 PROCEDURE — 83883 ASSAY NEPHELOMETRY NOT SPEC: CPT

## 2021-10-08 PROCEDURE — 84155 ASSAY OF PROTEIN SERUM: CPT

## 2021-10-08 PROCEDURE — 84165 PROTEIN E-PHORESIS SERUM: CPT

## 2021-10-08 PROCEDURE — 82784 ASSAY IGA/IGD/IGG/IGM EACH: CPT

## 2021-10-08 PROCEDURE — 80053 COMPREHEN METABOLIC PANEL: CPT

## 2021-10-11 LAB
IGA SERPL-MCNC: 69 MG/DL (ref 90–386)
IGG SERPL-MCNC: 579 MG/DL (ref 603–1613)
IGM SERPL-MCNC: 411 MG/DL (ref 20–172)

## 2021-10-29 ENCOUNTER — TELEPHONE (OUTPATIENT)
Dept: ENDOCRINOLOGY | Facility: CLINIC | Age: 61
End: 2021-10-29

## 2021-10-29 RX ORDER — GABAPENTIN 300 MG/1
150 CAPSULE ORAL 4 TIMES DAILY
COMMUNITY
End: 2022-03-24

## 2021-11-01 ENCOUNTER — HOSPITAL ENCOUNTER (OUTPATIENT)
Age: 61
End: 2021-11-01
Payer: COMMERCIAL

## 2021-11-01 DIAGNOSIS — K74.60: ICD-10-CM

## 2021-11-01 DIAGNOSIS — M79.671: ICD-10-CM

## 2021-11-01 DIAGNOSIS — D47.2: Primary | ICD-10-CM

## 2021-11-01 LAB
FOLATE: 15.6 NG/ML
TSH SERPL-ACNC: 0.74 UIU/ML (ref 0.47–4.68)
VITAMIN B12: 749 PG/ML (ref 239–931)

## 2021-11-01 PROCEDURE — 82746 ASSAY OF FOLIC ACID SERUM: CPT

## 2021-11-01 PROCEDURE — 36415 COLL VENOUS BLD VENIPUNCTURE: CPT

## 2021-11-01 PROCEDURE — 82607 VITAMIN B-12: CPT

## 2021-11-01 PROCEDURE — 84443 ASSAY THYROID STIM HORMONE: CPT

## 2021-11-04 RX ORDER — BLOOD SUGAR DIAGNOSTIC
STRIP MISCELLANEOUS
Qty: 200 EACH | Refills: 3 | Status: SHIPPED | OUTPATIENT
Start: 2021-11-04 | End: 2021-12-01 | Stop reason: SDUPTHER

## 2021-11-08 RX ORDER — INSULIN ASPART 100 [IU]/ML
95 INJECTION, SUSPENSION SUBCUTANEOUS
Qty: 180 ML | Refills: 0 | Status: SHIPPED | OUTPATIENT
Start: 2021-11-08 | End: 2021-12-01

## 2021-11-17 ENCOUNTER — OFFICE VISIT (OUTPATIENT)
Dept: ENDOCRINOLOGY | Facility: CLINIC | Age: 61
End: 2021-11-17

## 2021-11-17 ENCOUNTER — LAB (OUTPATIENT)
Dept: LAB | Facility: HOSPITAL | Age: 61
End: 2021-11-17

## 2021-11-17 VITALS
DIASTOLIC BLOOD PRESSURE: 90 MMHG | SYSTOLIC BLOOD PRESSURE: 140 MMHG | HEART RATE: 101 BPM | OXYGEN SATURATION: 94 % | BODY MASS INDEX: 42.66 KG/M2 | WEIGHT: 315 LBS | HEIGHT: 72 IN

## 2021-11-17 DIAGNOSIS — Z79.4 TYPE 2 DIABETES MELLITUS WITH HYPERGLYCEMIA, WITH LONG-TERM CURRENT USE OF INSULIN (HCC): Primary | Chronic | ICD-10-CM

## 2021-11-17 DIAGNOSIS — E11.65 TYPE 2 DIABETES MELLITUS WITH HYPERGLYCEMIA, WITH LONG-TERM CURRENT USE OF INSULIN (HCC): Primary | Chronic | ICD-10-CM

## 2021-11-17 DIAGNOSIS — Z79.4 TYPE 2 DIABETES MELLITUS WITH HYPERGLYCEMIA, WITH LONG-TERM CURRENT USE OF INSULIN (HCC): ICD-10-CM

## 2021-11-17 DIAGNOSIS — E11.65 TYPE 2 DIABETES MELLITUS WITH HYPERGLYCEMIA, WITH LONG-TERM CURRENT USE OF INSULIN (HCC): ICD-10-CM

## 2021-11-17 DIAGNOSIS — E66.01 CLASS 3 SEVERE OBESITY DUE TO EXCESS CALORIES WITH SERIOUS COMORBIDITY AND BODY MASS INDEX (BMI) OF 45.0 TO 49.9 IN ADULT (HCC): ICD-10-CM

## 2021-11-17 LAB
EXPIRATION DATE: ABNORMAL
GLUCOSE BLDC GLUCOMTR-MCNC: 169 MG/DL (ref 70–130)
Lab: ABNORMAL

## 2021-11-17 PROCEDURE — 82570 ASSAY OF URINE CREATININE: CPT

## 2021-11-17 PROCEDURE — 82947 ASSAY GLUCOSE BLOOD QUANT: CPT | Performed by: PHYSICIAN ASSISTANT

## 2021-11-17 PROCEDURE — 82043 UR ALBUMIN QUANTITATIVE: CPT

## 2021-11-17 PROCEDURE — 82985 ASSAY OF GLYCATED PROTEIN: CPT

## 2021-11-17 PROCEDURE — 99214 OFFICE O/P EST MOD 30 MIN: CPT | Performed by: PHYSICIAN ASSISTANT

## 2021-11-17 RX ORDER — ZINC GLUCONATE 50 MG
50 TABLET ORAL
COMMUNITY

## 2021-11-17 RX ORDER — PREGABALIN 75 MG/1
75 CAPSULE ORAL 2 TIMES DAILY
COMMUNITY
Start: 2021-11-01

## 2021-11-17 RX ORDER — DULAGLUTIDE 3 MG/.5ML
3 INJECTION, SOLUTION SUBCUTANEOUS WEEKLY
Qty: 2 ML | Refills: 5 | Status: SHIPPED | OUTPATIENT
Start: 2021-11-17 | End: 2022-01-20 | Stop reason: SDUPTHER

## 2021-11-17 RX ORDER — METFORMIN HYDROCHLORIDE 500 MG/1
1500 TABLET, EXTENDED RELEASE ORAL
Qty: 90 TABLET | Refills: 5 | Status: SHIPPED | OUTPATIENT
Start: 2021-11-17 | End: 2022-05-31

## 2021-11-17 RX ORDER — CYCLOBENZAPRINE HCL 10 MG
TABLET ORAL
COMMUNITY
Start: 2021-10-29 | End: 2022-03-24

## 2021-11-18 LAB
ALBUMIN UR-MCNC: <1.2 MG/DL
CREAT UR-MCNC: 104 MG/DL
MICROALBUMIN/CREAT UR: NORMAL MG/G{CREAT}

## 2021-11-19 LAB — FRUCTOSAMINE SERPL-SCNC: 364 UMOL/L (ref 0–285)

## 2021-11-23 ENCOUNTER — OFFICE VISIT (OUTPATIENT)
Dept: DIABETES SERVICES | Facility: HOSPITAL | Age: 61
End: 2021-11-23

## 2021-11-23 PROCEDURE — G0108 DIAB MANAGE TRN  PER INDIV: HCPCS

## 2021-11-30 RX ORDER — DULAGLUTIDE 1.5 MG/.5ML
INJECTION, SOLUTION SUBCUTANEOUS
Qty: 2 ML | Refills: 5 | OUTPATIENT
Start: 2021-11-30

## 2021-12-01 RX ORDER — INSULIN ASPART 100 [IU]/ML
INJECTION, SUSPENSION SUBCUTANEOUS
Qty: 60 ML | Refills: 5 | Status: SHIPPED | OUTPATIENT
Start: 2021-12-01 | End: 2022-06-13

## 2021-12-01 RX ORDER — BLOOD SUGAR DIAGNOSTIC
STRIP MISCELLANEOUS
Qty: 200 EACH | Refills: 3 | Status: SHIPPED | OUTPATIENT
Start: 2021-12-01 | End: 2022-10-21

## 2021-12-09 ENCOUNTER — HOSPITAL ENCOUNTER (OUTPATIENT)
Age: 61
End: 2021-12-09
Payer: COMMERCIAL

## 2021-12-09 DIAGNOSIS — G47.33: Primary | ICD-10-CM

## 2021-12-09 PROCEDURE — 95811 POLYSOM 6/>YRS CPAP 4/> PARM: CPT

## 2021-12-29 ENCOUNTER — HOSPITAL ENCOUNTER (OUTPATIENT)
Age: 61
End: 2021-12-29
Payer: COMMERCIAL

## 2021-12-29 DIAGNOSIS — C90.00: Primary | ICD-10-CM

## 2021-12-29 LAB
ALBUMIN LEVEL: 4.1 G/DL (ref 3.5–5)
ALBUMIN SERPL ELPH-MCNC: 3.2 G/DL
ALBUMIN/GLOB SERPL: 1.5 {RATIO} (ref 1.1–1.8)
ALP ISO SERPL-ACNC: 138 U/L (ref 38–126)
ALPHA1 GLOB SERPL ELPH-MCNC: 0.2 G/DL
ALPHA2 GLOB SERPL ELPH-MCNC: 0.8 G/DL
ALT SERPLBLD-CCNC: 33 U/L (ref 12–78)
ANION GAP SERPL CALC-SCNC: 16.5 MEQ/L (ref 5–15)
AST SERPL QL: 46 U/L (ref 17–59)
BILIRUBIN,TOTAL: 0.5 MG/DL (ref 0.2–1.3)
BUN SERPL-MCNC: 16 MG/DL (ref 9–20)
CALCIUM SPEC-MCNC: 9.5 MG/DL (ref 8.4–10.2)
CHLORIDE SPEC-SCNC: 103 MMOL/L (ref 98–107)
CO2 SERPL-SCNC: 23 MMOL/L (ref 22–30)
CREAT BLD-SCNC: 0.9 MG/DL (ref 0.66–1.25)
ESTIMATED GLOMERULAR FILT RATE: 86 ML/MIN (ref 60–?)
GAMMA GLOBULIN: 1
GFR (AFRICAN AMERICAN): 104 ML/MIN (ref 60–?)
GLOBULIN SER CALC-MCNC: 2.7 G/DL (ref 1.3–3.2)
GLUCOSE: 186 MG/DL (ref 74–100)
HCT VFR BLD CALC: 45.4 % (ref 42–52)
HGB BLD-MCNC: 14.9 G/DL (ref 14.1–18)
MCHC RBC-ENTMCNC: 32.7 G/DL (ref 31.8–35.4)
MCV RBC: 90.7 FL (ref 80–94)
MEAN CORPUSCULAR HEMOGLOBIN: 29.7 PG (ref 27–31.2)
PLATELET # BLD: 585 K/MM3 (ref 142–424)
POTASSIUM: 4.5 MMOL/L (ref 3.5–5.1)
PROT SERPL-MCNC: 6.8 G/DL (ref 6.3–8.2)
RBC # BLD AUTO: 5.01 M/MM3 (ref 4.6–6.2)
SODIUM SPEC-SCNC: 138 MMOL/L (ref 136–145)
WBC # BLD AUTO: 10.5 K/MM3 (ref 4.8–10.8)

## 2021-12-29 PROCEDURE — 84155 ASSAY OF PROTEIN SERUM: CPT

## 2021-12-29 PROCEDURE — 84165 PROTEIN E-PHORESIS SERUM: CPT

## 2021-12-29 PROCEDURE — 86334 IMMUNOFIX E-PHORESIS SERUM: CPT

## 2021-12-29 PROCEDURE — 85025 COMPLETE CBC W/AUTO DIFF WBC: CPT

## 2021-12-29 PROCEDURE — 82784 ASSAY IGA/IGD/IGG/IGM EACH: CPT

## 2021-12-29 PROCEDURE — 80053 COMPREHEN METABOLIC PANEL: CPT

## 2021-12-29 PROCEDURE — 36415 COLL VENOUS BLD VENIPUNCTURE: CPT

## 2021-12-29 PROCEDURE — 83883 ASSAY NEPHELOMETRY NOT SPEC: CPT

## 2021-12-30 LAB
IGA SERPL-MCNC: 116 MG/DL (ref 90–386)
IGG SERPL-MCNC: 634 MG/DL (ref 603–1613)
IGM SERPL-MCNC: 392 MG/DL (ref 20–172)

## 2022-01-18 ENCOUNTER — HOSPITAL ENCOUNTER (OUTPATIENT)
Dept: HOSPITAL 22 - OT | Age: 62
Discharge: HOME | End: 2022-01-18
Payer: COMMERCIAL

## 2022-01-18 DIAGNOSIS — G56.03: Primary | ICD-10-CM

## 2022-01-18 PROCEDURE — 97763 ORTHC/PROSTC MGMT SBSQ ENC: CPT

## 2022-01-20 DIAGNOSIS — E11.65 TYPE 2 DIABETES MELLITUS WITH HYPERGLYCEMIA, WITH LONG-TERM CURRENT USE OF INSULIN: Chronic | ICD-10-CM

## 2022-01-20 DIAGNOSIS — Z79.4 TYPE 2 DIABETES MELLITUS WITH HYPERGLYCEMIA, WITH LONG-TERM CURRENT USE OF INSULIN: Chronic | ICD-10-CM

## 2022-01-21 RX ORDER — DULAGLUTIDE 3 MG/.5ML
3 INJECTION, SOLUTION SUBCUTANEOUS WEEKLY
Qty: 2 ML | Refills: 5 | Status: SHIPPED | OUTPATIENT
Start: 2022-01-21 | End: 2022-07-18

## 2022-01-25 ENCOUNTER — HOSPITAL ENCOUNTER (OUTPATIENT)
Dept: HOSPITAL 22 - PT.CARL | Age: 62
LOS: 36 days | Discharge: HOME | End: 2022-03-02
Payer: COMMERCIAL

## 2022-01-25 DIAGNOSIS — M79.671: ICD-10-CM

## 2022-01-25 DIAGNOSIS — G47.33: ICD-10-CM

## 2022-01-25 DIAGNOSIS — D47.2: ICD-10-CM

## 2022-01-25 DIAGNOSIS — K74.60: ICD-10-CM

## 2022-01-25 DIAGNOSIS — Z99.89: ICD-10-CM

## 2022-01-25 DIAGNOSIS — M54.41: Primary | ICD-10-CM

## 2022-01-25 DIAGNOSIS — R20.2: ICD-10-CM

## 2022-01-25 PROCEDURE — 97110 THERAPEUTIC EXERCISES: CPT

## 2022-01-25 PROCEDURE — 97140 MANUAL THERAPY 1/> REGIONS: CPT

## 2022-01-25 PROCEDURE — 97164 PT RE-EVAL EST PLAN CARE: CPT

## 2022-01-25 PROCEDURE — 97010 HOT OR COLD PACKS THERAPY: CPT

## 2022-01-25 PROCEDURE — 97012 MECHANICAL TRACTION THERAPY: CPT

## 2022-01-25 PROCEDURE — 97163 PT EVAL HIGH COMPLEX 45 MIN: CPT

## 2022-02-18 LAB
ALBUMIN SERPL ELPH-MCNC: 3.6 G/DL
ALPHA1 GLOB SERPL ELPH-MCNC: 0.2 G/DL
ALPHA2 GLOB SERPL ELPH-MCNC: 0.8 G/DL
GAMMA GLOBULIN: 0.8

## 2022-03-07 ENCOUNTER — OFFICE VISIT (OUTPATIENT)
Dept: ENDOCRINOLOGY | Facility: CLINIC | Age: 62
End: 2022-03-07

## 2022-03-07 ENCOUNTER — LAB (OUTPATIENT)
Dept: LAB | Facility: HOSPITAL | Age: 62
End: 2022-03-07

## 2022-03-07 VITALS
BODY MASS INDEX: 42.66 KG/M2 | OXYGEN SATURATION: 96 % | SYSTOLIC BLOOD PRESSURE: 118 MMHG | WEIGHT: 315 LBS | DIASTOLIC BLOOD PRESSURE: 68 MMHG | HEIGHT: 72 IN | HEART RATE: 68 BPM

## 2022-03-07 DIAGNOSIS — E11.65 TYPE 2 DIABETES MELLITUS WITH HYPERGLYCEMIA, WITH LONG-TERM CURRENT USE OF INSULIN: ICD-10-CM

## 2022-03-07 DIAGNOSIS — E66.01 CLASS 3 SEVERE OBESITY DUE TO EXCESS CALORIES WITH SERIOUS COMORBIDITY AND BODY MASS INDEX (BMI) OF 45.0 TO 49.9 IN ADULT: Chronic | ICD-10-CM

## 2022-03-07 DIAGNOSIS — E11.65 TYPE 2 DIABETES MELLITUS WITH HYPERGLYCEMIA, WITH LONG-TERM CURRENT USE OF INSULIN: Primary | Chronic | ICD-10-CM

## 2022-03-07 DIAGNOSIS — Z79.4 TYPE 2 DIABETES MELLITUS WITH HYPERGLYCEMIA, WITH LONG-TERM CURRENT USE OF INSULIN: ICD-10-CM

## 2022-03-07 DIAGNOSIS — Z79.4 TYPE 2 DIABETES MELLITUS WITH HYPERGLYCEMIA, WITH LONG-TERM CURRENT USE OF INSULIN: Primary | Chronic | ICD-10-CM

## 2022-03-07 LAB
EXPIRATION DATE: ABNORMAL
GLUCOSE BLDC GLUCOMTR-MCNC: 145 MG/DL (ref 70–130)
Lab: ABNORMAL

## 2022-03-07 PROCEDURE — 82947 ASSAY GLUCOSE BLOOD QUANT: CPT | Performed by: PHYSICIAN ASSISTANT

## 2022-03-07 PROCEDURE — 82985 ASSAY OF GLYCATED PROTEIN: CPT

## 2022-03-07 PROCEDURE — 99214 OFFICE O/P EST MOD 30 MIN: CPT | Performed by: PHYSICIAN ASSISTANT

## 2022-03-07 RX ORDER — EMPAGLIFLOZIN 25 MG/1
25 TABLET, FILM COATED ORAL DAILY
Qty: 30 TABLET | Refills: 3 | Status: SHIPPED | OUTPATIENT
Start: 2022-03-07 | End: 2022-06-14 | Stop reason: SDUPTHER

## 2022-03-07 RX ORDER — PROCHLORPERAZINE 25 MG/1
1 SUPPOSITORY RECTAL
Qty: 1 EACH | Refills: 3 | Status: SHIPPED | OUTPATIENT
Start: 2022-03-07 | End: 2023-01-16

## 2022-03-07 RX ORDER — LISINOPRIL 10 MG/1
10 TABLET ORAL DAILY
COMMUNITY
Start: 2022-02-12

## 2022-03-07 RX ORDER — PROCHLORPERAZINE 25 MG/1
SUPPOSITORY RECTAL
Qty: 9 EACH | Refills: 3 | Status: SHIPPED | OUTPATIENT
Start: 2022-03-07 | End: 2023-01-16

## 2022-03-09 LAB — FRUCTOSAMINE SERPL-SCNC: 405 UMOL/L (ref 0–285)

## 2022-03-24 ENCOUNTER — OFFICE VISIT (OUTPATIENT)
Dept: NEUROSURGERY | Facility: CLINIC | Age: 62
End: 2022-03-24

## 2022-03-24 VITALS
BODY MASS INDEX: 42.66 KG/M2 | WEIGHT: 315 LBS | SYSTOLIC BLOOD PRESSURE: 138 MMHG | DIASTOLIC BLOOD PRESSURE: 80 MMHG | TEMPERATURE: 98.2 F | HEIGHT: 72 IN

## 2022-03-24 DIAGNOSIS — G89.29 CHRONIC LEFT-SIDED LOW BACK PAIN WITHOUT SCIATICA: Primary | ICD-10-CM

## 2022-03-24 DIAGNOSIS — M54.50 CHRONIC LEFT-SIDED LOW BACK PAIN WITHOUT SCIATICA: Primary | ICD-10-CM

## 2022-03-24 PROCEDURE — 99204 OFFICE O/P NEW MOD 45 MIN: CPT | Performed by: PHYSICIAN ASSISTANT

## 2022-05-18 RX ORDER — PEN NEEDLE, DIABETIC 32GX 5/32"
NEEDLE, DISPOSABLE MISCELLANEOUS
Qty: 200 EACH | Refills: 11 | Status: SHIPPED | OUTPATIENT
Start: 2022-05-18

## 2022-05-20 ENCOUNTER — DOCUMENTATION (OUTPATIENT)
Dept: ENDOCRINOLOGY | Facility: CLINIC | Age: 62
End: 2022-05-20

## 2022-05-31 RX ORDER — METFORMIN HYDROCHLORIDE 500 MG/1
1500 TABLET, EXTENDED RELEASE ORAL
Qty: 90 TABLET | Refills: 5 | Status: SHIPPED | OUTPATIENT
Start: 2022-05-31 | End: 2022-09-27 | Stop reason: SDUPTHER

## 2022-06-13 RX ORDER — INSULIN ASPART 100 [IU]/ML
INJECTION, SUSPENSION SUBCUTANEOUS
Qty: 60 ML | Refills: 5 | Status: SHIPPED | OUTPATIENT
Start: 2022-06-13 | End: 2022-12-27

## 2022-06-14 ENCOUNTER — LAB (OUTPATIENT)
Dept: LAB | Facility: HOSPITAL | Age: 62
End: 2022-06-14

## 2022-06-14 ENCOUNTER — OFFICE VISIT (OUTPATIENT)
Dept: ENDOCRINOLOGY | Facility: CLINIC | Age: 62
End: 2022-06-14

## 2022-06-14 VITALS
HEART RATE: 92 BPM | SYSTOLIC BLOOD PRESSURE: 118 MMHG | HEIGHT: 72 IN | BODY MASS INDEX: 42.66 KG/M2 | WEIGHT: 315 LBS | DIASTOLIC BLOOD PRESSURE: 68 MMHG | OXYGEN SATURATION: 95 %

## 2022-06-14 DIAGNOSIS — Z79.4 TYPE 2 DIABETES MELLITUS WITH HYPERGLYCEMIA, WITH LONG-TERM CURRENT USE OF INSULIN: ICD-10-CM

## 2022-06-14 DIAGNOSIS — E11.65 TYPE 2 DIABETES MELLITUS WITH HYPERGLYCEMIA, WITH LONG-TERM CURRENT USE OF INSULIN: Primary | Chronic | ICD-10-CM

## 2022-06-14 DIAGNOSIS — Z79.4 TYPE 2 DIABETES MELLITUS WITH HYPERGLYCEMIA, WITH LONG-TERM CURRENT USE OF INSULIN: Primary | Chronic | ICD-10-CM

## 2022-06-14 DIAGNOSIS — E11.65 TYPE 2 DIABETES MELLITUS WITH HYPERGLYCEMIA, WITH LONG-TERM CURRENT USE OF INSULIN: ICD-10-CM

## 2022-06-14 LAB
EXPIRATION DATE: NORMAL
GLUCOSE BLDC GLUCOMTR-MCNC: 106 MG/DL (ref 70–130)
Lab: NORMAL

## 2022-06-14 PROCEDURE — 95251 CONT GLUC MNTR ANALYSIS I&R: CPT | Performed by: PHYSICIAN ASSISTANT

## 2022-06-14 PROCEDURE — 82985 ASSAY OF GLYCATED PROTEIN: CPT

## 2022-06-14 PROCEDURE — 99213 OFFICE O/P EST LOW 20 MIN: CPT | Performed by: PHYSICIAN ASSISTANT

## 2022-06-14 RX ORDER — EMPAGLIFLOZIN 25 MG/1
25 TABLET, FILM COATED ORAL DAILY
Qty: 30 TABLET | Refills: 11 | Status: SHIPPED | OUTPATIENT
Start: 2022-06-14

## 2022-06-16 LAB — FRUCTOSAMINE SERPL-SCNC: 252 UMOL/L (ref 0–285)

## 2022-07-16 DIAGNOSIS — E11.65 TYPE 2 DIABETES MELLITUS WITH HYPERGLYCEMIA, WITH LONG-TERM CURRENT USE OF INSULIN: Chronic | ICD-10-CM

## 2022-07-16 DIAGNOSIS — Z79.4 TYPE 2 DIABETES MELLITUS WITH HYPERGLYCEMIA, WITH LONG-TERM CURRENT USE OF INSULIN: Chronic | ICD-10-CM

## 2022-07-18 RX ORDER — DULAGLUTIDE 3 MG/.5ML
3 INJECTION, SOLUTION SUBCUTANEOUS WEEKLY
Qty: 2 ML | Refills: 5 | Status: SHIPPED | OUTPATIENT
Start: 2022-07-18 | End: 2022-12-30 | Stop reason: SDUPTHER

## 2022-08-22 ENCOUNTER — HOSPITAL ENCOUNTER (OUTPATIENT)
Age: 62
End: 2022-08-22
Payer: COMMERCIAL

## 2022-08-22 DIAGNOSIS — D47.2: Primary | ICD-10-CM

## 2022-08-22 LAB
ALBUMIN LEVEL: 4.2 G/DL (ref 3.5–5)
ALBUMIN/GLOB SERPL: 1.6 {RATIO} (ref 1.1–1.8)
ALP ISO SERPL-ACNC: 101 U/L (ref 38–126)
ALT SERPLBLD-CCNC: 34 U/L (ref 12–78)
ANION GAP SERPL CALC-SCNC: 14.8 MEQ/L (ref 5–15)
AST SERPL QL: 39 U/L (ref 17–59)
BILIRUBIN,TOTAL: 0.4 MG/DL (ref 0.2–1.3)
BUN SERPL-MCNC: 18 MG/DL (ref 9–20)
CALCIUM SPEC-MCNC: 9.1 MG/DL (ref 8.4–10.2)
CHLORIDE SPEC-SCNC: 100 MMOL/L (ref 98–107)
CO2 SERPL-SCNC: 26 MMOL/L (ref 22–30)
CREAT BLD-SCNC: 1.2 MG/DL (ref 0.66–1.25)
ESTIMATED GLOMERULAR FILT RATE: 62 ML/MIN (ref 60–?)
GFR (AFRICAN AMERICAN): 74 ML/MIN (ref 60–?)
GLOBULIN SER CALC-MCNC: 2.6 G/DL (ref 1.3–3.2)
GLUCOSE: 85 MG/DL (ref 74–100)
HCT VFR BLD CALC: 52.5 % (ref 42–52)
HGB BLD-MCNC: 15.8 G/DL (ref 14.1–18)
MCHC RBC-ENTMCNC: 30.1 G/DL (ref 31.8–35.4)
MCV RBC: 92.8 FL (ref 80–94)
MEAN CORPUSCULAR HEMOGLOBIN: 28 PG (ref 27–31.2)
PLATELET # BLD: 470 K/MM3 (ref 142–424)
POTASSIUM: 4.8 MMOL/L (ref 3.5–5.1)
PROT SERPL-MCNC: 6.8 G/DL (ref 6.3–8.2)
RBC # BLD AUTO: 5.66 M/MM3 (ref 4.6–6.2)
SODIUM SPEC-SCNC: 136 MMOL/L (ref 136–145)
WBC # BLD AUTO: 13 K/MM3 (ref 4.8–10.8)

## 2022-08-22 PROCEDURE — 84155 ASSAY OF PROTEIN SERUM: CPT

## 2022-08-22 PROCEDURE — 80053 COMPREHEN METABOLIC PANEL: CPT

## 2022-08-22 PROCEDURE — 82784 ASSAY IGA/IGD/IGG/IGM EACH: CPT

## 2022-08-22 PROCEDURE — 84165 PROTEIN E-PHORESIS SERUM: CPT

## 2022-08-22 PROCEDURE — 36415 COLL VENOUS BLD VENIPUNCTURE: CPT

## 2022-08-22 PROCEDURE — 86334 IMMUNOFIX E-PHORESIS SERUM: CPT

## 2022-08-22 PROCEDURE — 85025 COMPLETE CBC W/AUTO DIFF WBC: CPT

## 2022-08-22 PROCEDURE — 83883 ASSAY NEPHELOMETRY NOT SPEC: CPT

## 2022-08-24 LAB
ALBUMIN SERPL ELPH-MCNC: 3.8 G/DL (ref 2.9–4.4)
ALPHA1 GLOB SERPL ELPH-MCNC: 0.2 G/DL (ref 0–0.4)
ALPHA2 GLOB SERPL ELPH-MCNC: 0.8 G/DL (ref 0.4–1)
GAMMA GLOBULIN: 0.8 G/DL (ref 0.4–1.8)

## 2022-08-25 LAB
IGA SERPL-MCNC: 50 MG/DL (ref 61–437)
IGG SERPL-MCNC: 510 MG/DL (ref 603–1613)
IGM SERPL-MCNC: 431 MG/DL (ref 20–172)

## 2022-09-27 ENCOUNTER — OFFICE VISIT (OUTPATIENT)
Dept: ENDOCRINOLOGY | Facility: CLINIC | Age: 62
End: 2022-09-27

## 2022-09-27 VITALS
OXYGEN SATURATION: 94 % | SYSTOLIC BLOOD PRESSURE: 120 MMHG | HEIGHT: 72 IN | WEIGHT: 315 LBS | HEART RATE: 86 BPM | DIASTOLIC BLOOD PRESSURE: 72 MMHG | BODY MASS INDEX: 42.66 KG/M2

## 2022-09-27 DIAGNOSIS — E11.65 TYPE 2 DIABETES MELLITUS WITH HYPERGLYCEMIA, WITH LONG-TERM CURRENT USE OF INSULIN: Primary | Chronic | ICD-10-CM

## 2022-09-27 DIAGNOSIS — Z79.4 TYPE 2 DIABETES MELLITUS WITH HYPERGLYCEMIA, WITH LONG-TERM CURRENT USE OF INSULIN: Primary | Chronic | ICD-10-CM

## 2022-09-27 PROCEDURE — 95251 CONT GLUC MNTR ANALYSIS I&R: CPT | Performed by: PHYSICIAN ASSISTANT

## 2022-09-27 PROCEDURE — 99213 OFFICE O/P EST LOW 20 MIN: CPT | Performed by: PHYSICIAN ASSISTANT

## 2022-09-27 RX ORDER — METFORMIN HYDROCHLORIDE 500 MG/1
1500 TABLET, EXTENDED RELEASE ORAL
Qty: 90 TABLET | Refills: 11 | Status: SHIPPED | OUTPATIENT
Start: 2022-09-27

## 2022-09-28 ENCOUNTER — TELEPHONE (OUTPATIENT)
Dept: ENDOCRINOLOGY | Facility: CLINIC | Age: 62
End: 2022-09-28

## 2022-10-21 RX ORDER — BLOOD SUGAR DIAGNOSTIC
STRIP MISCELLANEOUS
Qty: 200 EACH | Refills: 3 | Status: SHIPPED | OUTPATIENT
Start: 2022-10-21

## 2022-10-27 ENCOUNTER — PRIOR AUTHORIZATION (OUTPATIENT)
Dept: ENDOCRINOLOGY | Facility: CLINIC | Age: 62
End: 2022-10-27

## 2022-11-18 ENCOUNTER — HOSPITAL ENCOUNTER (OUTPATIENT)
Age: 62
End: 2022-11-18
Payer: COMMERCIAL

## 2022-11-18 DIAGNOSIS — D47.2: Primary | ICD-10-CM

## 2022-11-18 LAB
ALBUMIN LEVEL: 4.3 G/DL (ref 3.5–5)
ALBUMIN/GLOB SERPL: 1.6 {RATIO} (ref 1.1–1.8)
ALP ISO SERPL-ACNC: 114 U/L (ref 38–126)
ALT SERPLBLD-CCNC: 32 U/L (ref 12–78)
ANION GAP SERPL CALC-SCNC: 18.8 MEQ/L (ref 5–15)
AST SERPL QL: 46 U/L (ref 17–59)
BILIRUBIN,TOTAL: 0.6 MG/DL (ref 0.2–1.3)
BUN SERPL-MCNC: 20 MG/DL (ref 9–20)
CALCIUM SPEC-MCNC: 9.7 MG/DL (ref 8.4–10.2)
CHLORIDE SPEC-SCNC: 100 MMOL/L (ref 98–107)
CO2 SERPL-SCNC: 24 MMOL/L (ref 22–30)
CREAT BLD-SCNC: 1.2 MG/DL (ref 0.66–1.25)
ESTIMATED GLOMERULAR FILT RATE: 62 ML/MIN (ref 60–?)
GFR (AFRICAN AMERICAN): 74 ML/MIN (ref 60–?)
GLOBULIN SER CALC-MCNC: 2.7 G/DL (ref 1.3–3.2)
GLUCOSE: 133 MG/DL (ref 74–100)
HCT VFR BLD CALC: 49.7 % (ref 42–52)
HGB BLD-MCNC: 16.2 G/DL (ref 14.1–18)
MCHC RBC-ENTMCNC: 32.7 G/DL (ref 31.8–35.4)
MCV RBC: 87.9 FL (ref 80–94)
MEAN CORPUSCULAR HEMOGLOBIN: 28.7 PG (ref 27–31.2)
PLATELET # BLD: 462 K/MM3 (ref 142–424)
POTASSIUM: 4.8 MMOL/L (ref 3.5–5.1)
PROT SERPL-MCNC: 7 G/DL (ref 6.3–8.2)
RBC # BLD AUTO: 5.66 M/MM3 (ref 4.6–6.2)
SODIUM SPEC-SCNC: 138 MMOL/L (ref 136–145)
WBC # BLD AUTO: 11.4 K/MM3 (ref 4.8–10.8)

## 2022-11-18 PROCEDURE — 82784 ASSAY IGA/IGD/IGG/IGM EACH: CPT

## 2022-11-18 PROCEDURE — 36415 COLL VENOUS BLD VENIPUNCTURE: CPT

## 2022-11-18 PROCEDURE — 85025 COMPLETE CBC W/AUTO DIFF WBC: CPT

## 2022-11-18 PROCEDURE — 84155 ASSAY OF PROTEIN SERUM: CPT

## 2022-11-18 PROCEDURE — 83883 ASSAY NEPHELOMETRY NOT SPEC: CPT

## 2022-11-18 PROCEDURE — 84165 PROTEIN E-PHORESIS SERUM: CPT

## 2022-11-18 PROCEDURE — 80053 COMPREHEN METABOLIC PANEL: CPT

## 2022-11-19 LAB
IGA SERPL-MCNC: 49 MG/DL (ref 61–437)
IGG SERPL-MCNC: 539 MG/DL (ref 603–1613)
IGM SERPL-MCNC: 458 MG/DL (ref 20–172)

## 2022-11-21 LAB
ALBUMIN SERPL ELPH-MCNC: 3.5 G/DL (ref 2.9–4.4)
ALPHA1 GLOB SERPL ELPH-MCNC: 0.2 G/DL (ref 0–0.4)
ALPHA2 GLOB SERPL ELPH-MCNC: 0.9 G/DL (ref 0.4–1)
GAMMA GLOBULIN: 1 G/DL (ref 0.4–1.8)

## 2022-12-27 RX ORDER — INSULIN ASPART 100 [IU]/ML
INJECTION, SUSPENSION SUBCUTANEOUS
Qty: 60 ML | Refills: 5 | Status: SHIPPED | OUTPATIENT
Start: 2022-12-27

## 2022-12-30 ENCOUNTER — TELEPHONE (OUTPATIENT)
Dept: ENDOCRINOLOGY | Facility: CLINIC | Age: 62
End: 2022-12-30

## 2022-12-30 DIAGNOSIS — Z79.4 TYPE 2 DIABETES MELLITUS WITH HYPERGLYCEMIA, WITH LONG-TERM CURRENT USE OF INSULIN: Chronic | ICD-10-CM

## 2022-12-30 DIAGNOSIS — E11.65 TYPE 2 DIABETES MELLITUS WITH HYPERGLYCEMIA, WITH LONG-TERM CURRENT USE OF INSULIN: Chronic | ICD-10-CM

## 2022-12-30 RX ORDER — DULAGLUTIDE 3 MG/.5ML
3 INJECTION, SOLUTION SUBCUTANEOUS WEEKLY
Qty: 2 ML | Refills: 5 | Status: SHIPPED | OUTPATIENT
Start: 2022-12-30 | End: 2023-01-04

## 2022-12-30 RX ORDER — LIRAGLUTIDE 6 MG/ML
1.8 INJECTION SUBCUTANEOUS DAILY
Qty: 9 ML | Refills: 5 | Status: SHIPPED | OUTPATIENT
Start: 2022-12-30 | End: 2023-01-04

## 2023-01-04 ENCOUNTER — LAB (OUTPATIENT)
Dept: LAB | Facility: HOSPITAL | Age: 63
End: 2023-01-04
Payer: COMMERCIAL

## 2023-01-04 ENCOUNTER — OFFICE VISIT (OUTPATIENT)
Dept: ENDOCRINOLOGY | Facility: CLINIC | Age: 63
End: 2023-01-04
Payer: COMMERCIAL

## 2023-01-04 VITALS
RESPIRATION RATE: 20 BRPM | OXYGEN SATURATION: 98 % | SYSTOLIC BLOOD PRESSURE: 114 MMHG | HEIGHT: 72 IN | BODY MASS INDEX: 42.66 KG/M2 | WEIGHT: 315 LBS | HEART RATE: 80 BPM | DIASTOLIC BLOOD PRESSURE: 76 MMHG

## 2023-01-04 DIAGNOSIS — E78.2 MIXED HYPERLIPIDEMIA: ICD-10-CM

## 2023-01-04 DIAGNOSIS — Z79.4 TYPE 2 DIABETES MELLITUS WITH HYPERGLYCEMIA, WITH LONG-TERM CURRENT USE OF INSULIN: Primary | Chronic | ICD-10-CM

## 2023-01-04 DIAGNOSIS — E11.65 TYPE 2 DIABETES MELLITUS WITH HYPERGLYCEMIA, WITH LONG-TERM CURRENT USE OF INSULIN: Chronic | ICD-10-CM

## 2023-01-04 DIAGNOSIS — E11.65 TYPE 2 DIABETES MELLITUS WITH HYPERGLYCEMIA, WITH LONG-TERM CURRENT USE OF INSULIN: Primary | Chronic | ICD-10-CM

## 2023-01-04 DIAGNOSIS — I10 PRIMARY HYPERTENSION: ICD-10-CM

## 2023-01-04 DIAGNOSIS — Z79.4 TYPE 2 DIABETES MELLITUS WITH HYPERGLYCEMIA, WITH LONG-TERM CURRENT USE OF INSULIN: Chronic | ICD-10-CM

## 2023-01-04 PROCEDURE — 36415 COLL VENOUS BLD VENIPUNCTURE: CPT

## 2023-01-04 PROCEDURE — 99214 OFFICE O/P EST MOD 30 MIN: CPT | Performed by: INTERNAL MEDICINE

## 2023-01-04 PROCEDURE — 82985 ASSAY OF GLYCATED PROTEIN: CPT

## 2023-01-04 PROCEDURE — 95251 CONT GLUC MNTR ANALYSIS I&R: CPT | Performed by: INTERNAL MEDICINE

## 2023-01-04 RX ORDER — DULAGLUTIDE 3 MG/.5ML
3 INJECTION, SOLUTION SUBCUTANEOUS
Qty: 6 ML | Refills: 3 | Status: SHIPPED | OUTPATIENT
Start: 2023-01-04 | End: 2023-03-27 | Stop reason: SDUPTHER

## 2023-01-06 LAB — FRUCTOSAMINE SERPL-SCNC: 280 UMOL/L (ref 0–285)

## 2023-01-15 DIAGNOSIS — E11.65 TYPE 2 DIABETES MELLITUS WITH HYPERGLYCEMIA, WITH LONG-TERM CURRENT USE OF INSULIN: Chronic | ICD-10-CM

## 2023-01-15 DIAGNOSIS — Z79.4 TYPE 2 DIABETES MELLITUS WITH HYPERGLYCEMIA, WITH LONG-TERM CURRENT USE OF INSULIN: Chronic | ICD-10-CM

## 2023-01-16 RX ORDER — PROCHLORPERAZINE 25 MG/1
SUPPOSITORY RECTAL
Qty: 9 EACH | Refills: 3 | Status: SHIPPED | OUTPATIENT
Start: 2023-01-16

## 2023-01-16 RX ORDER — PROCHLORPERAZINE 25 MG/1
SUPPOSITORY RECTAL
Qty: 1 EACH | Refills: 3 | Status: SHIPPED | OUTPATIENT
Start: 2023-01-16

## 2023-02-27 ENCOUNTER — HOSPITAL ENCOUNTER (OUTPATIENT)
Age: 63
End: 2023-02-27
Payer: COMMERCIAL

## 2023-02-27 DIAGNOSIS — C90.00: Primary | ICD-10-CM

## 2023-02-27 LAB
ALBUMIN LEVEL: 4.5 G/DL (ref 3.5–5)
ALBUMIN/GLOB SERPL: 1.6 {RATIO} (ref 1.1–1.8)
ALP ISO SERPL-ACNC: 109 U/L (ref 38–126)
ALT SERPLBLD-CCNC: 30 U/L (ref 12–78)
ANION GAP SERPL CALC-SCNC: 11.1 MEQ/L (ref 5–15)
AST SERPL QL: 41 U/L (ref 17–59)
BILIRUBIN,TOTAL: 0.6 MG/DL (ref 0.2–1.3)
BUN SERPL-MCNC: 13 MG/DL (ref 9–20)
CALCIUM SPEC-MCNC: 9.1 MG/DL (ref 8.4–10.2)
CHLORIDE SPEC-SCNC: 104 MMOL/L (ref 98–107)
CO2 SERPL-SCNC: 28 MMOL/L (ref 22–30)
CREAT BLD-SCNC: 1.2 MG/DL (ref 0.66–1.25)
ESTIMATED GLOMERULAR FILT RATE: 62 ML/MIN (ref 60–?)
GFR (AFRICAN AMERICAN): 74 ML/MIN (ref 60–?)
GLOBULIN SER CALC-MCNC: 2.9 G/DL (ref 1.3–3.2)
GLUCOSE: 91 MG/DL (ref 74–100)
HCT VFR BLD CALC: 47.6 % (ref 42–52)
HGB BLD-MCNC: 15.8 G/DL (ref 14.1–18)
MCHC RBC-ENTMCNC: 33.3 G/DL (ref 31.8–35.4)
MCV RBC: 86.3 FL (ref 80–94)
MEAN CORPUSCULAR HEMOGLOBIN: 28.7 PG (ref 27–31.2)
PLATELET # BLD: 479 K/MM3 (ref 142–424)
POTASSIUM: 5.1 MMOL/L (ref 3.5–5.1)
PROT SERPL-MCNC: 7.4 G/DL (ref 6.3–8.2)
RBC # BLD AUTO: 5.52 M/MM3 (ref 4.6–6.2)
SODIUM SPEC-SCNC: 138 MMOL/L (ref 136–145)
WBC # BLD AUTO: 12 K/MM3 (ref 4.8–10.8)

## 2023-02-27 PROCEDURE — 83883 ASSAY NEPHELOMETRY NOT SPEC: CPT

## 2023-02-27 PROCEDURE — 84165 PROTEIN E-PHORESIS SERUM: CPT

## 2023-02-27 PROCEDURE — 82784 ASSAY IGA/IGD/IGG/IGM EACH: CPT

## 2023-02-27 PROCEDURE — 36415 COLL VENOUS BLD VENIPUNCTURE: CPT

## 2023-02-27 PROCEDURE — 80053 COMPREHEN METABOLIC PANEL: CPT

## 2023-02-27 PROCEDURE — 84155 ASSAY OF PROTEIN SERUM: CPT

## 2023-02-27 PROCEDURE — 85025 COMPLETE CBC W/AUTO DIFF WBC: CPT

## 2023-02-27 PROCEDURE — 86334 IMMUNOFIX E-PHORESIS SERUM: CPT

## 2023-03-01 LAB
ALBUMIN SERPL ELPH-MCNC: 3.9 G/DL (ref 2.9–4.4)
ALPHA1 GLOB SERPL ELPH-MCNC: 0.2 G/DL (ref 0–0.4)
ALPHA2 GLOB SERPL ELPH-MCNC: 0.9 G/DL (ref 0.4–1)
GAMMA GLOBULIN: 1 G/DL (ref 0.4–1.8)
IGA SERPL-MCNC: 47 MG/DL (ref 61–437)
IGG SERPL-MCNC: 560 MG/DL (ref 603–1613)
IGM SERPL-MCNC: 539 MG/DL (ref 20–172)

## 2023-03-03 LAB
IGA SERPL-MCNC: 43 MG/DL
IGG SERPL-MCNC: 539 MG/DL
IGM SERPL-MCNC: 531 MG/DL (ref 20–172)

## 2023-03-27 RX ORDER — BLOOD-GLUCOSE SENSOR
1 EACH MISCELLANEOUS
Qty: 9 EACH | Refills: 3 | Status: SHIPPED | OUTPATIENT
Start: 2023-03-27

## 2023-03-27 RX ORDER — DULAGLUTIDE 3 MG/.5ML
3 INJECTION, SOLUTION SUBCUTANEOUS
Qty: 6 ML | Refills: 3 | Status: SHIPPED | OUTPATIENT
Start: 2023-03-27

## 2023-03-27 RX ORDER — BLOOD-GLUCOSE,RECEIVER,CONT
1 EACH MISCELLANEOUS SEE ADMIN INSTRUCTIONS
Qty: 1 EACH | Refills: 0 | Status: SHIPPED | OUTPATIENT
Start: 2023-03-27

## 2023-04-13 ENCOUNTER — OFFICE VISIT (OUTPATIENT)
Dept: ENDOCRINOLOGY | Facility: CLINIC | Age: 63
End: 2023-04-13
Payer: COMMERCIAL

## 2023-04-13 VITALS
OXYGEN SATURATION: 97 % | WEIGHT: 315 LBS | SYSTOLIC BLOOD PRESSURE: 136 MMHG | DIASTOLIC BLOOD PRESSURE: 74 MMHG | HEIGHT: 72 IN | HEART RATE: 90 BPM | BODY MASS INDEX: 42.66 KG/M2

## 2023-04-13 DIAGNOSIS — E78.2 MIXED HYPERLIPIDEMIA: ICD-10-CM

## 2023-04-13 DIAGNOSIS — I10 PRIMARY HYPERTENSION: ICD-10-CM

## 2023-04-13 DIAGNOSIS — Z79.4 TYPE 2 DIABETES MELLITUS WITH HYPERGLYCEMIA, WITH LONG-TERM CURRENT USE OF INSULIN: Primary | Chronic | ICD-10-CM

## 2023-04-13 DIAGNOSIS — E11.65 TYPE 2 DIABETES MELLITUS WITH HYPERGLYCEMIA, WITH LONG-TERM CURRENT USE OF INSULIN: Primary | Chronic | ICD-10-CM

## 2023-04-13 LAB
ALBUMIN UR-MCNC: <1.2 MG/DL
CHOLEST SERPL-MCNC: 199 MG/DL (ref 0–200)
CREAT UR-MCNC: 102.8 MG/DL
HDLC SERPL-MCNC: 46 MG/DL (ref 40–60)
LDLC SERPL CALC-MCNC: 127 MG/DL (ref 0–100)
LDLC/HDLC SERPL: 2.69 {RATIO}
MICROALBUMIN/CREAT UR: NORMAL MG/G{CREAT}
TRIGL SERPL-MCNC: 146 MG/DL (ref 0–150)
TSH SERPL DL<=0.05 MIU/L-ACNC: 0.71 UIU/ML (ref 0.27–4.2)
VLDLC SERPL-MCNC: 26 MG/DL (ref 5–40)

## 2023-04-13 PROCEDURE — 82043 UR ALBUMIN QUANTITATIVE: CPT | Performed by: INTERNAL MEDICINE

## 2023-04-13 PROCEDURE — 99214 OFFICE O/P EST MOD 30 MIN: CPT | Performed by: INTERNAL MEDICINE

## 2023-04-13 PROCEDURE — 82985 ASSAY OF GLYCATED PROTEIN: CPT | Performed by: INTERNAL MEDICINE

## 2023-04-13 PROCEDURE — 95251 CONT GLUC MNTR ANALYSIS I&R: CPT | Performed by: INTERNAL MEDICINE

## 2023-04-13 PROCEDURE — 84443 ASSAY THYROID STIM HORMONE: CPT | Performed by: INTERNAL MEDICINE

## 2023-04-13 PROCEDURE — 80061 LIPID PANEL: CPT | Performed by: INTERNAL MEDICINE

## 2023-04-13 PROCEDURE — 82570 ASSAY OF URINE CREATININE: CPT | Performed by: INTERNAL MEDICINE

## 2023-04-13 RX ORDER — ZANUBRUTINIB 80 MG/1
CAPSULE, GELATIN COATED ORAL
COMMUNITY
Start: 2023-04-03

## 2023-04-14 LAB — FRUCTOSAMINE SERPL-SCNC: 238 UMOL/L (ref 0–285)

## 2023-05-30 RX ORDER — PEN NEEDLE, DIABETIC 32GX 5/32"
NEEDLE, DISPOSABLE MISCELLANEOUS
Qty: 200 EACH | Refills: 3 | Status: SHIPPED | OUTPATIENT
Start: 2023-05-30

## 2023-06-09 RX ORDER — EMPAGLIFLOZIN 25 MG/1
TABLET, FILM COATED ORAL
Qty: 90 TABLET | Refills: 1 | Status: SHIPPED | OUTPATIENT
Start: 2023-06-09

## 2023-08-14 ENCOUNTER — HOSPITAL ENCOUNTER (OUTPATIENT)
Age: 63
End: 2023-08-14
Payer: COMMERCIAL

## 2023-08-14 DIAGNOSIS — C90.00: Primary | ICD-10-CM

## 2023-08-14 LAB
ALBUMIN LEVEL: 4.3 G/DL (ref 3.5–5)
ALBUMIN/GLOB SERPL: 1.3 {RATIO} (ref 1.1–1.8)
ALP ISO SERPL-ACNC: 136 U/L (ref 38–126)
ALT SERPLBLD-CCNC: 34 U/L (ref 12–78)
ANION GAP SERPL CALC-SCNC: 15.2 MEQ/L (ref 5–15)
AST SERPL QL: 38 U/L (ref 17–59)
BILIRUBIN,TOTAL: 0.4 MG/DL (ref 0.2–1.3)
BUN SERPL-MCNC: 15 MG/DL (ref 9–20)
CALCIUM SPEC-MCNC: 8.8 MG/DL (ref 8.4–10.2)
CELLS COUNTED: 100
CHLORIDE SPEC-SCNC: 104 MMOL/L (ref 98–107)
CO2 SERPL-SCNC: 23 MMOL/L (ref 22–30)
CREAT BLD-SCNC: 1 MG/DL (ref 0.66–1.25)
ESTIMATED GLOMERULAR FILT RATE: 76 ML/MIN (ref 60–?)
GFR (AFRICAN AMERICAN): 92 ML/MIN (ref 60–?)
GLOBULIN SER CALC-MCNC: 3.3 G/DL (ref 1.3–3.2)
GLUCOSE: 170 MG/DL (ref 74–100)
HCT VFR BLD CALC: 48.8 % (ref 42–52)
HGB BLD-MCNC: 15.4 G/DL (ref 14.1–18)
MANUAL DIFFERENTIAL: (no result)
MCHC RBC-ENTMCNC: 31.5 G/DL (ref 31.8–35.4)
MCV RBC: 87.5 FL (ref 80–94)
MEAN CORPUSCULAR HEMOGLOBIN: 27.6 PG (ref 27–31.2)
PLATELET # BLD: 568 K/MM3 (ref 142–424)
POTASSIUM: 4.2 MMOL/L (ref 3.5–5.1)
PROT SERPL-MCNC: 7.6 G/DL (ref 6.3–8.2)
RBC # BLD AUTO: 5.58 M/MM3 (ref 4.6–6.2)
SODIUM SPEC-SCNC: 138 MMOL/L (ref 136–145)
WBC # BLD AUTO: 11.9 K/MM3 (ref 4.8–10.8)

## 2023-08-14 PROCEDURE — 84165 PROTEIN E-PHORESIS SERUM: CPT

## 2023-08-14 PROCEDURE — 85018 HEMOGLOBIN: CPT

## 2023-08-14 PROCEDURE — 85007 BL SMEAR W/DIFF WBC COUNT: CPT

## 2023-08-14 PROCEDURE — 80053 COMPREHEN METABOLIC PANEL: CPT

## 2023-08-14 PROCEDURE — 85014 HEMATOCRIT: CPT

## 2023-08-14 PROCEDURE — 85049 AUTOMATED PLATELET COUNT: CPT

## 2023-08-14 PROCEDURE — 36415 COLL VENOUS BLD VENIPUNCTURE: CPT

## 2023-08-14 PROCEDURE — 82784 ASSAY IGA/IGD/IGG/IGM EACH: CPT

## 2023-08-14 PROCEDURE — 85048 AUTOMATED LEUKOCYTE COUNT: CPT

## 2023-08-14 PROCEDURE — 84155 ASSAY OF PROTEIN SERUM: CPT

## 2023-08-14 PROCEDURE — 83883 ASSAY NEPHELOMETRY NOT SPEC: CPT

## 2023-08-16 LAB
ALBUMIN SERPL ELPH-MCNC: 3.7 G/DL (ref 2.9–4.4)
ALPHA1 GLOB SERPL ELPH-MCNC: 0.2 G/DL (ref 0–0.4)
ALPHA2 GLOB SERPL ELPH-MCNC: 0.9 G/DL (ref 0.4–1)
GAMMA GLOBULIN: 0.9 G/DL (ref 0.4–1.8)
IGA SERPL-MCNC: 45 MG/DL (ref 61–437)
IGG SERPL-MCNC: 470 MG/DL (ref 603–1613)
IGM SERPL-MCNC: 536 MG/DL (ref 20–172)

## 2023-08-23 ENCOUNTER — OFFICE VISIT (OUTPATIENT)
Dept: ENDOCRINOLOGY | Facility: CLINIC | Age: 63
End: 2023-08-23
Payer: COMMERCIAL

## 2023-08-23 VITALS
SYSTOLIC BLOOD PRESSURE: 122 MMHG | WEIGHT: 315 LBS | BODY MASS INDEX: 42.66 KG/M2 | HEART RATE: 90 BPM | HEIGHT: 72 IN | OXYGEN SATURATION: 97 % | DIASTOLIC BLOOD PRESSURE: 76 MMHG

## 2023-08-23 DIAGNOSIS — E78.2 MIXED HYPERLIPIDEMIA: ICD-10-CM

## 2023-08-23 DIAGNOSIS — I10 PRIMARY HYPERTENSION: ICD-10-CM

## 2023-08-23 DIAGNOSIS — E11.65 TYPE 2 DIABETES MELLITUS WITH HYPERGLYCEMIA, WITH LONG-TERM CURRENT USE OF INSULIN: Primary | Chronic | ICD-10-CM

## 2023-08-23 DIAGNOSIS — Z79.4 TYPE 2 DIABETES MELLITUS WITH HYPERGLYCEMIA, WITH LONG-TERM CURRENT USE OF INSULIN: Primary | Chronic | ICD-10-CM

## 2023-08-23 PROCEDURE — 82985 ASSAY OF GLYCATED PROTEIN: CPT | Performed by: INTERNAL MEDICINE

## 2023-08-23 PROCEDURE — 99214 OFFICE O/P EST MOD 30 MIN: CPT | Performed by: INTERNAL MEDICINE

## 2023-08-23 PROCEDURE — 95251 CONT GLUC MNTR ANALYSIS I&R: CPT | Performed by: INTERNAL MEDICINE

## 2023-08-23 RX ORDER — CHLORHEXIDINE GLUCONATE 0.12 MG/ML
RINSE ORAL 2 TIMES DAILY
COMMUNITY
Start: 2023-08-09

## 2023-08-23 RX ORDER — ROSUVASTATIN CALCIUM 10 MG/1
10 TABLET, COATED ORAL DAILY
Qty: 90 TABLET | Refills: 3 | Status: SHIPPED | OUTPATIENT
Start: 2023-08-23

## 2023-08-23 RX ORDER — CLINDAMYCIN HYDROCHLORIDE 300 MG/1
300 CAPSULE ORAL 3 TIMES DAILY
COMMUNITY
Start: 2023-08-14

## 2023-08-23 RX ORDER — HYDROCODONE BITARTRATE AND ACETAMINOPHEN 7.5; 325 MG/1; MG/1
1 TABLET ORAL EVERY 6 HOURS PRN
COMMUNITY

## 2023-08-23 RX ORDER — DULOXETIN HYDROCHLORIDE 60 MG/1
CAPSULE, DELAYED RELEASE ORAL DAILY
COMMUNITY
Start: 2023-08-12

## 2023-08-24 LAB — FRUCTOSAMINE SERPL-SCNC: 215 UMOL/L (ref 0–285)

## 2023-09-15 RX ORDER — BLOOD SUGAR DIAGNOSTIC
STRIP MISCELLANEOUS
Qty: 200 EACH | Refills: 3 | Status: SHIPPED | OUTPATIENT
Start: 2023-09-15

## 2023-09-15 RX ORDER — EMPAGLIFLOZIN 25 MG/1
TABLET, FILM COATED ORAL
Qty: 90 TABLET | Refills: 3 | Status: SHIPPED | OUTPATIENT
Start: 2023-09-15

## 2023-10-03 ENCOUNTER — TELEPHONE (OUTPATIENT)
Dept: ENDOCRINOLOGY | Facility: CLINIC | Age: 63
End: 2023-10-03
Payer: COMMERCIAL

## 2023-10-03 DIAGNOSIS — E11.65 TYPE 2 DIABETES MELLITUS WITH HYPERGLYCEMIA, WITH LONG-TERM CURRENT USE OF INSULIN: Chronic | ICD-10-CM

## 2023-10-03 DIAGNOSIS — Z79.4 TYPE 2 DIABETES MELLITUS WITH HYPERGLYCEMIA, WITH LONG-TERM CURRENT USE OF INSULIN: Chronic | ICD-10-CM

## 2023-10-03 RX ORDER — METFORMIN HYDROCHLORIDE 500 MG/1
1500 TABLET, EXTENDED RELEASE ORAL
Qty: 270 TABLET | Refills: 0 | Status: SHIPPED | OUTPATIENT
Start: 2023-10-03

## 2023-10-04 ENCOUNTER — HOSPITAL ENCOUNTER (OUTPATIENT)
Age: 63
End: 2023-10-04
Payer: COMMERCIAL

## 2023-10-04 DIAGNOSIS — C90.00: Primary | ICD-10-CM

## 2023-10-04 LAB
ALBUMIN LEVEL: 4.4 G/DL (ref 3.5–5)
ALBUMIN/GLOB SERPL: 1.6 {RATIO} (ref 1.1–1.8)
ALP ISO SERPL-ACNC: 100 U/L (ref 38–126)
ALT SERPLBLD-CCNC: 27 U/L (ref 12–78)
ANION GAP SERPL CALC-SCNC: 12.6 MEQ/L (ref 5–15)
AST SERPL QL: 35 U/L (ref 17–59)
BILIRUBIN,TOTAL: 0.3 MG/DL (ref 0.2–1.3)
BUN SERPL-MCNC: 15 MG/DL (ref 9–20)
CALCIUM SPEC-MCNC: 9.4 MG/DL (ref 8.4–10.2)
CHLORIDE SPEC-SCNC: 102 MMOL/L (ref 98–107)
CO2 SERPL-SCNC: 29 MMOL/L (ref 22–30)
CREAT BLD-SCNC: 1.1 MG/DL (ref 0.66–1.25)
ESTIMATED GLOMERULAR FILT RATE: 68 ML/MIN (ref 60–?)
GFR (AFRICAN AMERICAN): 82 ML/MIN (ref 60–?)
GLOBULIN SER CALC-MCNC: 2.7 G/DL (ref 1.3–3.2)
GLUCOSE: 137 MG/DL (ref 74–100)
HCT VFR BLD CALC: 46.2 % (ref 42–52)
HGB BLD-MCNC: 14.4 G/DL (ref 14.1–18)
MANUAL DIFFERENTIAL: (no result)
MCHC RBC-ENTMCNC: 31.2 G/DL (ref 31.8–35.4)
MCV RBC: 86.2 FL (ref 80–94)
MEAN CORPUSCULAR HEMOGLOBIN: 26.9 PG (ref 27–31.2)
PLATELET # BLD: 452 K/MM3 (ref 142–424)
POTASSIUM: 4.6 MMOL/L (ref 3.5–5.1)
PROT SERPL-MCNC: 7.1 G/DL (ref 6.3–8.2)
RBC # BLD AUTO: 5.36 M/MM3 (ref 4.6–6.2)
SODIUM SPEC-SCNC: 139 MMOL/L (ref 136–145)
WBC # BLD AUTO: 16.4 K/MM3 (ref 4.8–10.8)

## 2023-10-04 PROCEDURE — 85025 COMPLETE CBC W/AUTO DIFF WBC: CPT

## 2023-10-04 PROCEDURE — 82784 ASSAY IGA/IGD/IGG/IGM EACH: CPT

## 2023-10-04 PROCEDURE — 80053 COMPREHEN METABOLIC PANEL: CPT

## 2023-10-04 PROCEDURE — 84155 ASSAY OF PROTEIN SERUM: CPT

## 2023-10-04 PROCEDURE — 83883 ASSAY NEPHELOMETRY NOT SPEC: CPT

## 2023-10-04 PROCEDURE — 83615 LACTATE (LD) (LDH) ENZYME: CPT

## 2023-10-04 PROCEDURE — 84165 PROTEIN E-PHORESIS SERUM: CPT

## 2023-10-04 PROCEDURE — 36415 COLL VENOUS BLD VENIPUNCTURE: CPT

## 2023-10-04 PROCEDURE — 85007 BL SMEAR W/DIFF WBC COUNT: CPT

## 2023-10-05 LAB — CELLS COUNTED: 100

## 2023-10-06 LAB
IGA SERPL-MCNC: 45 MG/DL (ref 61–437)
IGG SERPL-MCNC: 488 MG/DL (ref 603–1613)
IGM SERPL-MCNC: 477 MG/DL (ref 20–172)

## 2023-10-10 LAB
ALBUMIN SERPL ELPH-MCNC: 4 G/DL
ALPHA1 GLOB SERPL ELPH-MCNC: 0.2 G/DL
ALPHA2 GLOB SERPL ELPH-MCNC: 0.8 G/DL
GAMMA GLOBULIN: 0.8

## 2023-11-07 RX ORDER — DULAGLUTIDE 3 MG/.5ML
3 INJECTION, SOLUTION SUBCUTANEOUS
Qty: 6 ML | Refills: 3 | Status: SHIPPED | OUTPATIENT
Start: 2023-11-07

## 2023-12-20 ENCOUNTER — HOSPITAL ENCOUNTER (OUTPATIENT)
Age: 63
End: 2023-12-20
Payer: COMMERCIAL

## 2023-12-20 DIAGNOSIS — C90.00: Primary | ICD-10-CM

## 2023-12-20 DIAGNOSIS — C88.0: ICD-10-CM

## 2023-12-20 LAB
ALBUMIN LEVEL: 4.3 G/DL (ref 3.5–5)
ALBUMIN/GLOB SERPL: 1.6 {RATIO} (ref 1.1–1.8)
ALP ISO SERPL-ACNC: 94 U/L (ref 38–126)
ALT SERPLBLD-CCNC: 26 U/L (ref 12–78)
ANION GAP SERPL CALC-SCNC: 14.5 MEQ/L (ref 5–15)
AST SERPL QL: 43 U/L (ref 17–59)
BILIRUBIN,TOTAL: 0.7 MG/DL (ref 0.2–1.3)
BUN SERPL-MCNC: 12 MG/DL (ref 9–20)
CALCIUM SPEC-MCNC: 8.7 MG/DL (ref 8.4–10.2)
CHLORIDE SPEC-SCNC: 106 MMOL/L (ref 98–107)
CO2 SERPL-SCNC: 24 MMOL/L (ref 22–30)
CREAT BLD-SCNC: 1.1 MG/DL (ref 0.66–1.25)
ESTIMATED GLOMERULAR FILT RATE: 68 ML/MIN (ref 60–?)
GFR (AFRICAN AMERICAN): 82 ML/MIN (ref 60–?)
GLOBULIN SER CALC-MCNC: 2.7 G/DL (ref 1.3–3.2)
GLUCOSE: 111 MG/DL (ref 74–100)
HCT VFR BLD CALC: 43.5 % (ref 42–52)
HGB BLD-MCNC: 13.8 G/DL (ref 14.1–18)
MCHC RBC-ENTMCNC: 31.7 G/DL (ref 31.8–35.4)
MCV RBC: 85.3 FL (ref 80–94)
MEAN CORPUSCULAR HEMOGLOBIN: 27 PG (ref 27–31.2)
PLATELET # BLD: 512 K/MM3 (ref 142–424)
POTASSIUM: 4.5 MMOL/L (ref 3.5–5.1)
PROT SERPL-MCNC: 7 G/DL (ref 6.3–8.2)
RBC # BLD AUTO: 5.1 M/MM3 (ref 4.6–6.2)
SODIUM SPEC-SCNC: 140 MMOL/L (ref 136–145)
WBC # BLD AUTO: 9 K/MM3 (ref 4.8–10.8)

## 2023-12-20 PROCEDURE — 83883 ASSAY NEPHELOMETRY NOT SPEC: CPT

## 2023-12-20 PROCEDURE — 84165 PROTEIN E-PHORESIS SERUM: CPT

## 2023-12-20 PROCEDURE — 36415 COLL VENOUS BLD VENIPUNCTURE: CPT

## 2023-12-20 PROCEDURE — 85025 COMPLETE CBC W/AUTO DIFF WBC: CPT

## 2023-12-20 PROCEDURE — 84155 ASSAY OF PROTEIN SERUM: CPT

## 2023-12-20 PROCEDURE — 82784 ASSAY IGA/IGD/IGG/IGM EACH: CPT

## 2023-12-20 PROCEDURE — 80053 COMPREHEN METABOLIC PANEL: CPT

## 2023-12-21 ENCOUNTER — OFFICE VISIT (OUTPATIENT)
Dept: ENDOCRINOLOGY | Facility: CLINIC | Age: 63
End: 2023-12-21
Payer: COMMERCIAL

## 2023-12-21 VITALS
OXYGEN SATURATION: 95 % | WEIGHT: 315 LBS | BODY MASS INDEX: 42.66 KG/M2 | HEIGHT: 72 IN | HEART RATE: 70 BPM | DIASTOLIC BLOOD PRESSURE: 70 MMHG | SYSTOLIC BLOOD PRESSURE: 120 MMHG

## 2023-12-21 DIAGNOSIS — Z79.4 TYPE 2 DIABETES MELLITUS WITH HYPERGLYCEMIA, WITH LONG-TERM CURRENT USE OF INSULIN: Primary | ICD-10-CM

## 2023-12-21 DIAGNOSIS — E11.65 TYPE 2 DIABETES MELLITUS WITH HYPERGLYCEMIA, WITH LONG-TERM CURRENT USE OF INSULIN: Primary | ICD-10-CM

## 2023-12-21 DIAGNOSIS — I10 PRIMARY HYPERTENSION: ICD-10-CM

## 2023-12-21 DIAGNOSIS — E78.2 MIXED HYPERLIPIDEMIA: ICD-10-CM

## 2023-12-21 LAB
CHOLEST SERPL-MCNC: 124 MG/DL (ref 0–200)
EXPIRATION DATE: NORMAL
GLUCOSE BLDC GLUCOMTR-MCNC: 108 MG/DL (ref 70–130)
HDLC SERPL-MCNC: 44 MG/DL (ref 40–60)
IGA SERPL-MCNC: 88 MG/DL (ref 61–437)
IGG SERPL-MCNC: 681 MG/DL (ref 603–1613)
IGM SERPL-MCNC: 431 MG/DL (ref 20–172)
LDLC SERPL CALC-MCNC: 66 MG/DL (ref 0–100)
LDLC/HDLC SERPL: 1.52 {RATIO}
Lab: NORMAL
TRIGL SERPL-MCNC: 65 MG/DL (ref 0–150)
VLDLC SERPL-MCNC: 14 MG/DL (ref 5–40)

## 2023-12-21 PROCEDURE — 80061 LIPID PANEL: CPT | Performed by: INTERNAL MEDICINE

## 2023-12-21 PROCEDURE — 82985 ASSAY OF GLYCATED PROTEIN: CPT | Performed by: INTERNAL MEDICINE

## 2023-12-22 ENCOUNTER — PRIOR AUTHORIZATION (OUTPATIENT)
Dept: ENDOCRINOLOGY | Facility: CLINIC | Age: 63
End: 2023-12-22
Payer: COMMERCIAL

## 2023-12-22 LAB — FRUCTOSAMINE SERPL-SCNC: 205 UMOL/L (ref 0–285)

## 2023-12-26 LAB
ALBUMIN SERPL ELPH-MCNC: 3.8 G/DL
ALPHA1 GLOB SERPL ELPH-MCNC: 0.2 G/DL
ALPHA2 GLOB SERPL ELPH-MCNC: 0.8 G/DL
GAMMA GLOBULIN: 0.9
LABORATORY REPORT: (no result)

## 2023-12-28 RX ORDER — INSULIN ASPART 100 [IU]/ML
INJECTION, SUSPENSION SUBCUTANEOUS
Qty: 60 ML | Refills: 5 | Status: SHIPPED | OUTPATIENT
Start: 2023-12-28

## 2024-01-09 DIAGNOSIS — Z79.4 TYPE 2 DIABETES MELLITUS WITH HYPERGLYCEMIA, WITH LONG-TERM CURRENT USE OF INSULIN: Chronic | ICD-10-CM

## 2024-01-09 DIAGNOSIS — E11.65 TYPE 2 DIABETES MELLITUS WITH HYPERGLYCEMIA, WITH LONG-TERM CURRENT USE OF INSULIN: Chronic | ICD-10-CM

## 2024-01-09 RX ORDER — METFORMIN HYDROCHLORIDE 500 MG/1
1500 TABLET, EXTENDED RELEASE ORAL
Qty: 270 TABLET | Refills: 1 | Status: SHIPPED | OUTPATIENT
Start: 2024-01-09

## 2024-02-08 ENCOUNTER — HOSPITAL ENCOUNTER (OUTPATIENT)
Dept: HOSPITAL 22 - LAB | Age: 64
End: 2024-02-08
Payer: COMMERCIAL

## 2024-02-08 DIAGNOSIS — C88.0: Primary | ICD-10-CM

## 2024-02-08 DIAGNOSIS — C90.00: ICD-10-CM

## 2024-02-08 LAB
ALBUMIN LEVEL: 4.4 G/DL (ref 3.5–5)
ALBUMIN/GLOB SERPL: 1.7 {RATIO} (ref 1.1–1.8)
ALP ISO SERPL-ACNC: 74 U/L (ref 38–126)
ALT SERPLBLD-CCNC: 25 U/L (ref 12–78)
ANION GAP SERPL CALC-SCNC: 11.3 MEQ/L (ref 5–15)
AST SERPL QL: 34 U/L (ref 17–59)
BILIRUBIN,TOTAL: 0.3 MG/DL (ref 0.2–1.3)
BUN SERPL-MCNC: 16 MG/DL (ref 9–20)
CALCIUM SPEC-MCNC: 9.3 MG/DL (ref 8.4–10.2)
CHLORIDE SPEC-SCNC: 106 MMOL/L (ref 98–107)
CO2 SERPL-SCNC: 26 MMOL/L (ref 22–30)
CREATININE,SERUM: 1.2 MG/DL (ref 0.66–1.25)
ESTIMATED GLOMERULAR FILT RATE: 61 ML/MIN (ref 60–?)
GFR (AFRICAN AMERICAN): 74 ML/MIN (ref 60–?)
GLOBULIN SER CALC-MCNC: 2.6 G/DL (ref 1.3–3.2)
GLUCOSE: 79 MG/DL (ref 74–100)
HCT VFR BLD CALC: 42 % (ref 42–52)
HGB BLD-MCNC: 13.7 G/DL (ref 14.1–18)
MCHC RBC-ENTMCNC: 32.7 G/DL (ref 31.8–35.4)
MCV RBC: 82 FL (ref 80–94)
MEAN CORPUSCULAR HEMOGLOBIN: 26.8 PG (ref 27–31.2)
PLATELET # BLD: 513 K/MM3 (ref 142–424)
POTASSIUM: 4.3 MMOL/L (ref 3.5–5.1)
PROT SERPL-MCNC: 7 G/DL (ref 6.3–8.2)
RBC # BLD AUTO: 5.12 M/MM3 (ref 4.6–6.2)
SODIUM SPEC-SCNC: 139 MMOL/L (ref 136–145)
WBC # BLD AUTO: 14.1 K/MM3 (ref 4.8–10.8)

## 2024-02-08 PROCEDURE — 84165 PROTEIN E-PHORESIS SERUM: CPT

## 2024-02-08 PROCEDURE — 84155 ASSAY OF PROTEIN SERUM: CPT

## 2024-02-08 PROCEDURE — 80053 COMPREHEN METABOLIC PANEL: CPT

## 2024-02-08 PROCEDURE — 82784 ASSAY IGA/IGD/IGG/IGM EACH: CPT

## 2024-02-08 PROCEDURE — 85025 COMPLETE CBC W/AUTO DIFF WBC: CPT

## 2024-02-08 PROCEDURE — 36415 COLL VENOUS BLD VENIPUNCTURE: CPT

## 2024-02-08 PROCEDURE — 83883 ASSAY NEPHELOMETRY NOT SPEC: CPT

## 2024-02-08 PROCEDURE — 86334 IMMUNOFIX E-PHORESIS SERUM: CPT

## 2024-02-12 LAB
IMMUNOGLOBULIN A, QN: 71 MG/DL (ref 61–437)
IMMUNOGLOBULIN G, QN: 774 MG/DL (ref 603–1613)
IMMUNOGLOBULIN M, QN: 459 MG/DL (ref 20–172)

## 2024-02-13 LAB
ALBUMIN: 3.8
ALPHA-1-GLOBULIN: 0.2
ALPHA-2-GLOBULIN: 0.8
GAMMA GLOBULIN: 1
Lab: (no result)

## 2024-03-20 ENCOUNTER — TELEPHONE (OUTPATIENT)
Dept: ENDOCRINOLOGY | Facility: CLINIC | Age: 64
End: 2024-03-20
Payer: COMMERCIAL

## 2024-03-20 RX ORDER — ACYCLOVIR 400 MG/1
1 TABLET ORAL
Qty: 9 EACH | Refills: 3 | Status: SHIPPED | OUTPATIENT
Start: 2024-03-20

## 2024-05-11 ENCOUNTER — HOSPITAL ENCOUNTER (EMERGENCY)
Age: 64
Discharge: HOME | End: 2024-05-11
Payer: COMMERCIAL

## 2024-05-11 VITALS
HEART RATE: 93 BPM | TEMPERATURE: 98 F | OXYGEN SATURATION: 95 % | SYSTOLIC BLOOD PRESSURE: 133 MMHG | DIASTOLIC BLOOD PRESSURE: 77 MMHG | RESPIRATION RATE: 19 BRPM

## 2024-05-11 VITALS — BODY MASS INDEX: 48.2 KG/M2

## 2024-05-11 VITALS
OXYGEN SATURATION: 95 % | SYSTOLIC BLOOD PRESSURE: 133 MMHG | HEART RATE: 93 BPM | DIASTOLIC BLOOD PRESSURE: 77 MMHG | RESPIRATION RATE: 19 BRPM | TEMPERATURE: 98 F

## 2024-05-11 DIAGNOSIS — R09.81: ICD-10-CM

## 2024-05-11 DIAGNOSIS — J01.90: Primary | ICD-10-CM

## 2024-05-11 DIAGNOSIS — H61.23: ICD-10-CM

## 2024-05-11 DIAGNOSIS — R05.9: ICD-10-CM

## 2024-05-11 PROCEDURE — G0463 HOSPITAL OUTPT CLINIC VISIT: HCPCS

## 2024-05-11 PROCEDURE — 99204 OFFICE O/P NEW MOD 45 MIN: CPT

## 2024-05-11 PROCEDURE — 99212 OFFICE O/P EST SF 10 MIN: CPT

## 2024-05-30 ENCOUNTER — HOSPITAL ENCOUNTER (OUTPATIENT)
Age: 64
Discharge: HOME | End: 2024-05-30
Payer: COMMERCIAL

## 2024-05-30 DIAGNOSIS — C90.00: Primary | ICD-10-CM

## 2024-05-30 LAB
ALBUMIN LEVEL: 4.4 G/DL (ref 3.5–5)
ALBUMIN/GLOB SERPL: 1.5 {RATIO} (ref 1.1–1.8)
ALP ISO SERPL-ACNC: 82 U/L (ref 38–126)
ALT SERPLBLD-CCNC: 31 U/L (ref 12–78)
ANION GAP SERPL CALC-SCNC: 14.7 MEQ/L (ref 5–15)
AST SERPL QL: 41 U/L (ref 17–59)
BILIRUBIN,TOTAL: 0.5 MG/DL (ref 0.2–1.3)
BUN SERPL-MCNC: 20 MG/DL (ref 9–20)
CALCIUM SPEC-MCNC: 9.8 MG/DL (ref 8.4–10.2)
CHLORIDE SPEC-SCNC: 106 MMOL/L (ref 98–107)
CO2 SERPL-SCNC: 25 MMOL/L (ref 22–30)
CREATININE,SERUM: 1.5 MG/DL (ref 0.66–1.25)
ESTIMATED GLOMERULAR FILT RATE: 47 ML/MIN (ref 60–?)
GFR (AFRICAN AMERICAN): 57 ML/MIN (ref 60–?)
GLOBULIN SER CALC-MCNC: 3 G/DL (ref 1.3–3.2)
GLUCOSE: 79 MG/DL (ref 74–100)
HCT VFR BLD CALC: 45.2 % (ref 42–52)
HGB BLD-MCNC: 14.3 G/DL (ref 14.1–18)
MCHC RBC-ENTMCNC: 31.5 G/DL (ref 31.8–35.4)
MCV RBC: 82.3 FL (ref 80–94)
MEAN CORPUSCULAR HEMOGLOBIN: 26 PG (ref 27–31.2)
PLATELET # BLD: 491 K/MM3 (ref 142–424)
POTASSIUM: 4.7 MMOL/L (ref 3.5–5.1)
PROT SERPL-MCNC: 7.4 G/DL (ref 6.3–8.2)
RBC # BLD AUTO: 5.5 M/MM3 (ref 4.6–6.2)
SODIUM SPEC-SCNC: 141 MMOL/L (ref 136–145)
WBC # BLD AUTO: 11.4 K/MM3 (ref 4.8–10.8)

## 2024-05-30 PROCEDURE — 82784 ASSAY IGA/IGD/IGG/IGM EACH: CPT

## 2024-05-30 PROCEDURE — 85025 COMPLETE CBC W/AUTO DIFF WBC: CPT

## 2024-05-30 PROCEDURE — 86334 IMMUNOFIX E-PHORESIS SERUM: CPT

## 2024-05-30 PROCEDURE — 80053 COMPREHEN METABOLIC PANEL: CPT

## 2024-05-30 PROCEDURE — 83883 ASSAY NEPHELOMETRY NOT SPEC: CPT

## 2024-05-30 PROCEDURE — 84165 PROTEIN E-PHORESIS SERUM: CPT

## 2024-05-30 PROCEDURE — 36415 COLL VENOUS BLD VENIPUNCTURE: CPT

## 2024-05-30 PROCEDURE — 84155 ASSAY OF PROTEIN SERUM: CPT

## 2024-06-03 LAB
ALBUMIN: 3.9 G/DL (ref 2.9–4.4)
ALPHA-1-GLOBULIN: 0.2 G/DL (ref 0–0.4)
ALPHA-2-GLOBULIN: 0.9 G/DL (ref 0.4–1)
GAMMA GLOBULIN: 0.9 G/DL (ref 0.4–1.8)
IMMUNOGLOBULIN A, QN: 73 MG/DL (ref 61–437)
IMMUNOGLOBULIN G, QN: 710 MG/DL (ref 603–1613)
IMMUNOGLOBULIN M, QN: 550 MG/DL (ref 20–172)

## 2024-06-12 LAB — Lab: (no result)

## 2024-06-13 ENCOUNTER — PRIOR AUTHORIZATION (OUTPATIENT)
Dept: ENDOCRINOLOGY | Facility: CLINIC | Age: 64
End: 2024-06-13
Payer: COMMERCIAL

## 2024-06-24 ENCOUNTER — OFFICE VISIT (OUTPATIENT)
Dept: ENDOCRINOLOGY | Facility: CLINIC | Age: 64
End: 2024-06-24
Payer: COMMERCIAL

## 2024-06-24 VITALS
SYSTOLIC BLOOD PRESSURE: 130 MMHG | DIASTOLIC BLOOD PRESSURE: 82 MMHG | WEIGHT: 315 LBS | OXYGEN SATURATION: 97 % | BODY MASS INDEX: 48.2 KG/M2 | HEART RATE: 80 BPM

## 2024-06-24 DIAGNOSIS — E78.2 MIXED HYPERLIPIDEMIA: ICD-10-CM

## 2024-06-24 DIAGNOSIS — I10 PRIMARY HYPERTENSION: ICD-10-CM

## 2024-06-24 DIAGNOSIS — E11.65 TYPE 2 DIABETES MELLITUS WITH HYPERGLYCEMIA, WITH LONG-TERM CURRENT USE OF INSULIN: Primary | Chronic | ICD-10-CM

## 2024-06-24 DIAGNOSIS — Z79.4 TYPE 2 DIABETES MELLITUS WITH HYPERGLYCEMIA, WITH LONG-TERM CURRENT USE OF INSULIN: Primary | Chronic | ICD-10-CM

## 2024-06-24 PROCEDURE — 82985 ASSAY OF GLYCATED PROTEIN: CPT | Performed by: INTERNAL MEDICINE

## 2024-06-24 PROCEDURE — 99214 OFFICE O/P EST MOD 30 MIN: CPT | Performed by: INTERNAL MEDICINE

## 2024-06-24 RX ORDER — TIRZEPATIDE 10 MG/.5ML
10 INJECTION, SOLUTION SUBCUTANEOUS WEEKLY
Qty: 6 ML | Refills: 3 | Status: SHIPPED | OUTPATIENT
Start: 2024-06-24

## 2024-06-24 RX ORDER — ROSUVASTATIN CALCIUM 10 MG/1
10 TABLET, COATED ORAL DAILY
Qty: 90 TABLET | Refills: 3 | Status: SHIPPED | OUTPATIENT
Start: 2024-06-24

## 2024-06-24 RX ORDER — PEN NEEDLE, DIABETIC 32GX 5/32"
NEEDLE, DISPOSABLE MISCELLANEOUS
Qty: 200 EACH | Refills: 3 | Status: SHIPPED | OUTPATIENT
Start: 2024-06-24

## 2024-06-24 RX ORDER — LIDOCAINE AND PRILOCAINE 25; 25 MG/G; MG/G
CREAM TOPICAL
COMMUNITY
Start: 2024-06-03

## 2024-06-24 RX ORDER — INSULIN ASPART 100 [IU]/ML
INJECTION, SUSPENSION SUBCUTANEOUS
Qty: 60 ML | Refills: 5 | Status: SHIPPED | OUTPATIENT
Start: 2024-06-24

## 2024-06-24 RX ORDER — POTASSIUM CHLORIDE 600 MG/1
8 TABLET, FILM COATED, EXTENDED RELEASE ORAL
COMMUNITY
Start: 2024-05-09

## 2024-06-24 RX ORDER — METFORMIN HYDROCHLORIDE 500 MG/1
1500 TABLET, EXTENDED RELEASE ORAL
Qty: 270 TABLET | Refills: 1 | Status: SHIPPED | OUTPATIENT
Start: 2024-06-24

## 2024-06-25 LAB — FRUCTOSAMINE SERPL-SCNC: 224 UMOL/L (ref 0–285)

## 2024-06-26 ENCOUNTER — PRIOR AUTHORIZATION (OUTPATIENT)
Dept: ENDOCRINOLOGY | Facility: CLINIC | Age: 64
End: 2024-06-26
Payer: COMMERCIAL

## 2024-07-10 ENCOUNTER — HOSPITAL ENCOUNTER (OUTPATIENT)
Dept: HOSPITAL 22 - LAB | Age: 64
Discharge: HOME | End: 2024-07-10
Payer: COMMERCIAL

## 2024-07-10 ENCOUNTER — HOSPITAL ENCOUNTER (OUTPATIENT)
Dept: HOSPITAL 22 - SC.PAIN | Age: 64
Discharge: HOME | End: 2024-07-10
Payer: COMMERCIAL

## 2024-07-10 VITALS — BODY MASS INDEX: 47.5 KG/M2

## 2024-07-10 VITALS
RESPIRATION RATE: 18 BRPM | DIASTOLIC BLOOD PRESSURE: 68 MMHG | OXYGEN SATURATION: 94 % | HEART RATE: 82 BPM | SYSTOLIC BLOOD PRESSURE: 116 MMHG

## 2024-07-10 DIAGNOSIS — M54.50: ICD-10-CM

## 2024-07-10 DIAGNOSIS — G89.29: Primary | ICD-10-CM

## 2024-07-10 DIAGNOSIS — C90.00: Primary | ICD-10-CM

## 2024-07-10 LAB
ALBUMIN LEVEL: 4.5 G/DL (ref 3.5–5)
ALBUMIN/GLOB SERPL: 1.7 {RATIO} (ref 1.1–1.8)
ALP ISO SERPL-ACNC: 93 U/L (ref 38–126)
ALT SERPLBLD-CCNC: 39 U/L (ref 12–78)
ANION GAP SERPL CALC-SCNC: 12.6 MEQ/L (ref 5–15)
AST SERPL QL: 64 U/L (ref 17–59)
BILIRUBIN,TOTAL: 0.6 MG/DL (ref 0.2–1.3)
BUN SERPL-MCNC: 18 MG/DL (ref 9–20)
CALCIUM SPEC-MCNC: 9.6 MG/DL (ref 8.4–10.2)
CHLORIDE SPEC-SCNC: 106 MMOL/L (ref 98–107)
CO2 SERPL-SCNC: 24 MMOL/L (ref 22–30)
CREAT BLD-SCNC: 1.1 MG/DL (ref 0.66–1.25)
ESTIMATED GLOMERULAR FILT RATE: 68 ML/MIN (ref 60–?)
GFR (AFRICAN AMERICAN): 82 ML/MIN (ref 60–?)
GLOBULIN SER CALC-MCNC: 2.7 G/DL (ref 1.3–3.2)
GLUCOSE: 89 MG/DL (ref 74–100)
HCT VFR BLD CALC: 44.2 % (ref 42–52)
HGB BLD-MCNC: 14.3 G/DL (ref 14.1–18)
MAGNESIUM: 2 MG/DL (ref 1.6–2.3)
MCHC RBC-ENTMCNC: 32.2 G/DL (ref 31.8–35.4)
MCV RBC: 82.9 FL (ref 80–94)
MEAN CORPUSCULAR HEMOGLOBIN: 26.7 PG (ref 27–31.2)
PHOSPHOROUS: 4.3 MG/DL (ref 2.5–4.5)
PLATELET # BLD: 506 K/MM3 (ref 142–424)
POTASSIUM: 4.6 MMOL/L (ref 3.5–5.1)
PROT SERPL-MCNC: 7.2 G/DL (ref 6.3–8.2)
RBC # BLD AUTO: 5.33 M/MM3 (ref 4.6–6.2)
SODIUM SPEC-SCNC: 138 MMOL/L (ref 136–145)
URATE SERPL-SCNC: 4.1 MG/DL (ref 3.5–8.5)
WBC # BLD AUTO: 8 K/MM3 (ref 4.8–10.8)

## 2024-07-10 PROCEDURE — 36415 COLL VENOUS BLD VENIPUNCTURE: CPT

## 2024-07-10 PROCEDURE — 85025 COMPLETE CBC W/AUTO DIFF WBC: CPT

## 2024-07-10 PROCEDURE — 83735 ASSAY OF MAGNESIUM: CPT

## 2024-07-10 PROCEDURE — 84100 ASSAY OF PHOSPHORUS: CPT

## 2024-07-10 PROCEDURE — G0463 HOSPITAL OUTPT CLINIC VISIT: HCPCS

## 2024-07-10 PROCEDURE — 80053 COMPREHEN METABOLIC PANEL: CPT

## 2024-07-10 PROCEDURE — 99202 OFFICE O/P NEW SF 15 MIN: CPT

## 2024-07-10 PROCEDURE — 84550 ASSAY OF BLOOD/URIC ACID: CPT

## 2024-07-17 ENCOUNTER — HOSPITAL ENCOUNTER (OUTPATIENT)
Dept: HOSPITAL 22 - LAB | Age: 64
Discharge: HOME | End: 2024-07-17
Payer: COMMERCIAL

## 2024-07-17 DIAGNOSIS — C90.00: Primary | ICD-10-CM

## 2024-07-17 LAB
ALBUMIN LEVEL: 4 G/DL (ref 3.5–5)
ALBUMIN/GLOB SERPL: 1.5 {RATIO} (ref 1.1–1.8)
ALP ISO SERPL-ACNC: 90 U/L (ref 38–126)
ALT SERPLBLD-CCNC: 39 U/L (ref 12–78)
ANION GAP SERPL CALC-SCNC: 9.2 MEQ/L (ref 5–15)
AST SERPL QL: 58 U/L (ref 17–59)
BILIRUB DIRECT SERPL-MCNC: 0 MG/DL (ref 0–0.4)
BILIRUB INDIRECT SERPL-MCNC: 0.6 MG/DL (ref 0–0.9)
BILIRUB INDIRECT SERPL-MCNC: 0.6 MG/DL (ref 0–1.1)
BILIRUBIN,TOTAL: 0.6 MG/DL (ref 0.2–1.3)
BUN SERPL-MCNC: 18 MG/DL (ref 9–20)
CALCIUM SPEC-MCNC: 8.7 MG/DL (ref 8.4–10.2)
CHLORIDE SPEC-SCNC: 108 MMOL/L (ref 98–107)
CHOLEST SPEC-SCNC: 136 MG/DL (ref 140–200)
CO2 SERPL-SCNC: 25 MMOL/L (ref 22–30)
CREAT BLD-SCNC: 1.1 MG/DL (ref 0.66–1.25)
ESTIMATED GLOMERULAR FILT RATE: 68 ML/MIN (ref 60–?)
GFR (AFRICAN AMERICAN): 82 ML/MIN (ref 60–?)
GLOBULIN SER CALC-MCNC: 2.6 G/DL (ref 1.3–3.2)
GLUCOSE: 133 MG/DL (ref 74–100)
HCT VFR BLD CALC: 40.7 % (ref 42–52)
HDLC SERPL-MCNC: 40 MG/DL (ref 40–60)
HGB BLD-MCNC: 13.2 G/DL (ref 14.1–18)
MCHC RBC-ENTMCNC: 32.3 G/DL (ref 31.8–35.4)
MCV RBC: 82.5 FL (ref 80–94)
MEAN CORPUSCULAR HEMOGLOBIN: 26.7 PG (ref 27–31.2)
PLATELET # BLD: 473 K/MM3 (ref 142–424)
POTASSIUM: 4.2 MMOL/L (ref 3.5–5.1)
PROT SERPL-MCNC: 6.6 G/DL (ref 6.3–8.2)
RBC # BLD AUTO: 4.93 M/MM3 (ref 4.6–6.2)
SODIUM SPEC-SCNC: 138 MMOL/L (ref 136–145)
TRIGLYCERIDES: 181 MG/DL (ref 30–150)
WBC # BLD AUTO: 7.5 K/MM3 (ref 4.8–10.8)

## 2024-07-17 PROCEDURE — 80061 LIPID PANEL: CPT

## 2024-07-17 PROCEDURE — 80076 HEPATIC FUNCTION PANEL: CPT

## 2024-07-17 PROCEDURE — 80053 COMPREHEN METABOLIC PANEL: CPT

## 2024-07-17 PROCEDURE — 85025 COMPLETE CBC W/AUTO DIFF WBC: CPT

## 2024-07-17 PROCEDURE — 36415 COLL VENOUS BLD VENIPUNCTURE: CPT

## 2024-07-23 ENCOUNTER — HOSPITAL ENCOUNTER (OUTPATIENT)
Age: 64
Discharge: HOME | End: 2024-07-23
Payer: COMMERCIAL

## 2024-07-23 VITALS
DIASTOLIC BLOOD PRESSURE: 63 MMHG | HEART RATE: 102 BPM | RESPIRATION RATE: 18 BRPM | OXYGEN SATURATION: 97 % | SYSTOLIC BLOOD PRESSURE: 120 MMHG | TEMPERATURE: 97.88 F

## 2024-07-23 VITALS
OXYGEN SATURATION: 98 % | HEART RATE: 105 BPM | SYSTOLIC BLOOD PRESSURE: 135 MMHG | RESPIRATION RATE: 20 BRPM | DIASTOLIC BLOOD PRESSURE: 78 MMHG

## 2024-07-23 VITALS
RESPIRATION RATE: 18 BRPM | HEART RATE: 102 BPM | OXYGEN SATURATION: 98 % | DIASTOLIC BLOOD PRESSURE: 77 MMHG | SYSTOLIC BLOOD PRESSURE: 142 MMHG | TEMPERATURE: 97.8 F

## 2024-07-23 VITALS — BODY MASS INDEX: 47.5 KG/M2

## 2024-07-23 DIAGNOSIS — M79.18: Primary | ICD-10-CM

## 2024-07-23 LAB
ALBUMIN LEVEL: 3.8 G/DL (ref 3.5–5)
ALBUMIN/GLOB SERPL: 1.6 {RATIO} (ref 1.1–1.8)
ALP ISO SERPL-ACNC: 93 U/L (ref 38–126)
ALT SERPLBLD-CCNC: 41 U/L (ref 12–78)
ANION GAP SERPL CALC-SCNC: 14.1 MEQ/L (ref 5–15)
AST SERPL QL: 45 U/L (ref 17–59)
BILIRUBIN,TOTAL: 0.7 MG/DL (ref 0.2–1.3)
BUN SERPL-MCNC: 18 MG/DL (ref 9–20)
CALCIUM SPEC-MCNC: 9 MG/DL (ref 8.4–10.2)
CHLORIDE SPEC-SCNC: 108 MMOL/L (ref 98–107)
CO2 SERPL-SCNC: 21 MMOL/L (ref 22–30)
CREAT BLD-SCNC: 1.1 MG/DL (ref 0.66–1.25)
CREATININE CLEARANCE ESTIMATED: 75 ML/MIN (ref 50–200)
ESTIMATED GLOMERULAR FILT RATE: 68 ML/MIN (ref 60–?)
GFR (AFRICAN AMERICAN): 82 ML/MIN (ref 60–?)
GLOBULIN SER CALC-MCNC: 2.4 G/DL (ref 1.3–3.2)
GLUCOSE: 192 MG/DL (ref 74–100)
HCT VFR BLD CALC: 43 % (ref 42–52)
HGB BLD-MCNC: 13.7 G/DL (ref 14.1–18)
MAGNESIUM: 1.9 MG/DL (ref 1.6–2.3)
MCHC RBC-ENTMCNC: 31.8 G/DL (ref 31.8–35.4)
MCV RBC: 82.6 FL (ref 80–94)
MEAN CORPUSCULAR HEMOGLOBIN: 26.3 PG (ref 27–31.2)
PHOSPHOROUS: 3.3 MG/DL (ref 2.5–4.5)
PLATELET # BLD: 492 K/MM3 (ref 142–424)
POTASSIUM: 4.1 MMOL/L (ref 3.5–5.1)
PROT SERPL-MCNC: 6.2 G/DL (ref 6.3–8.2)
RBC # BLD AUTO: 5.21 M/MM3 (ref 4.6–6.2)
SODIUM SPEC-SCNC: 139 MMOL/L (ref 136–145)
URATE SERPL-SCNC: 3.8 MG/DL (ref 3.5–8.5)
WBC # BLD AUTO: 9 K/MM3 (ref 4.8–10.8)

## 2024-07-23 PROCEDURE — 20552 NJX 1/MLT TRIGGER POINT 1/2: CPT

## 2024-07-23 PROCEDURE — 85025 COMPLETE CBC W/AUTO DIFF WBC: CPT

## 2024-07-23 PROCEDURE — 36415 COLL VENOUS BLD VENIPUNCTURE: CPT

## 2024-07-23 PROCEDURE — 84550 ASSAY OF BLOOD/URIC ACID: CPT

## 2024-07-23 PROCEDURE — 80053 COMPREHEN METABOLIC PANEL: CPT

## 2024-07-23 PROCEDURE — 84100 ASSAY OF PHOSPHORUS: CPT

## 2024-07-23 PROCEDURE — 83735 ASSAY OF MAGNESIUM: CPT

## 2024-08-08 ENCOUNTER — TELEPHONE (OUTPATIENT)
Dept: ENDOCRINOLOGY | Facility: CLINIC | Age: 64
End: 2024-08-08
Payer: COMMERCIAL

## 2024-08-08 ENCOUNTER — HOSPITAL ENCOUNTER (OUTPATIENT)
Dept: HOSPITAL 22 - SC.PAIN | Age: 64
Discharge: HOME | End: 2024-08-08
Payer: COMMERCIAL

## 2024-08-08 VITALS
OXYGEN SATURATION: 86 % | HEART RATE: 86 BPM | SYSTOLIC BLOOD PRESSURE: 134 MMHG | DIASTOLIC BLOOD PRESSURE: 68 MMHG | RESPIRATION RATE: 16 BRPM

## 2024-08-08 VITALS — BODY MASS INDEX: 46.1 KG/M2

## 2024-08-08 DIAGNOSIS — Z95.0: ICD-10-CM

## 2024-08-08 DIAGNOSIS — M79.18: Primary | ICD-10-CM

## 2024-08-08 DIAGNOSIS — Z79.899: ICD-10-CM

## 2024-08-08 PROCEDURE — 99212 OFFICE O/P EST SF 10 MIN: CPT

## 2024-08-08 PROCEDURE — G0463 HOSPITAL OUTPT CLINIC VISIT: HCPCS

## 2024-08-08 RX ORDER — INSULIN ASPART 100 [IU]/ML
95 INJECTION, SUSPENSION SUBCUTANEOUS 2 TIMES DAILY
Qty: 60 ML | Refills: 5 | Status: SHIPPED | OUTPATIENT
Start: 2024-08-08

## 2024-08-14 ENCOUNTER — HOSPITAL ENCOUNTER (OUTPATIENT)
Dept: HOSPITAL 22 - LAB | Age: 64
Discharge: HOME | End: 2024-08-14
Payer: COMMERCIAL

## 2024-08-14 DIAGNOSIS — C88.0: Primary | ICD-10-CM

## 2024-08-14 LAB
ALBUMIN LEVEL: 4.6 G/DL (ref 3.5–5)
ALBUMIN/GLOB SERPL: 1.7 {RATIO} (ref 1.1–1.8)
ALP ISO SERPL-ACNC: 114 U/L (ref 38–126)
ALT SERPLBLD-CCNC: 42 U/L (ref 12–78)
ANION GAP SERPL CALC-SCNC: 16.5 MEQ/L (ref 5–15)
AST SERPL QL: 47 U/L (ref 17–59)
BILIRUBIN,TOTAL: 0.6 MG/DL (ref 0.2–1.3)
BUN SERPL-MCNC: 17 MG/DL (ref 9–20)
CALCIUM SPEC-MCNC: 9.4 MG/DL (ref 8.4–10.2)
CHLORIDE SPEC-SCNC: 103 MMOL/L (ref 98–107)
CO2 SERPL-SCNC: 21 MMOL/L (ref 22–30)
CREAT BLD-SCNC: 1.2 MG/DL (ref 0.66–1.25)
ESTIMATED GLOMERULAR FILT RATE: 61 ML/MIN (ref 60–?)
GFR (AFRICAN AMERICAN): 74 ML/MIN (ref 60–?)
GLOBULIN SER CALC-MCNC: 2.7 G/DL (ref 1.3–3.2)
GLUCOSE: 104 MG/DL (ref 74–100)
HCT VFR BLD CALC: 49.5 % (ref 42–52)
HGB BLD-MCNC: 15.1 G/DL (ref 14.1–18)
MCHC RBC-ENTMCNC: 30.6 G/DL (ref 31.8–35.4)
MCV RBC: 86.4 FL (ref 80–94)
MEAN CORPUSCULAR HEMOGLOBIN: 26.4 PG (ref 27–31.2)
PLATELET # BLD: 540 K/MM3 (ref 142–424)
POTASSIUM: 4.5 MMOL/L (ref 3.5–5.1)
PROT SERPL-MCNC: 7.3 G/DL (ref 6.3–8.2)
RBC # BLD AUTO: 5.73 M/MM3 (ref 4.6–6.2)
SODIUM SPEC-SCNC: 136 MMOL/L (ref 136–145)
WBC # BLD AUTO: 5.6 K/MM3 (ref 4.8–10.8)

## 2024-08-14 PROCEDURE — 80053 COMPREHEN METABOLIC PANEL: CPT

## 2024-08-14 PROCEDURE — 84550 ASSAY OF BLOOD/URIC ACID: CPT

## 2024-08-14 PROCEDURE — 85025 COMPLETE CBC W/AUTO DIFF WBC: CPT

## 2024-08-14 PROCEDURE — 83615 LACTATE (LD) (LDH) ENZYME: CPT

## 2024-08-14 PROCEDURE — 36415 COLL VENOUS BLD VENIPUNCTURE: CPT

## 2024-08-19 LAB — URATE SERPL-SCNC: 4.2 MG/DL (ref 3.5–8.5)

## 2024-08-27 RX ORDER — BLOOD SUGAR DIAGNOSTIC
STRIP MISCELLANEOUS
Qty: 200 EACH | Refills: 3 | Status: SHIPPED | OUTPATIENT
Start: 2024-08-27

## 2024-08-28 ENCOUNTER — HOSPITAL ENCOUNTER (OUTPATIENT)
Dept: HOSPITAL 22 - LAB | Age: 64
Discharge: HOME | End: 2024-08-28
Payer: COMMERCIAL

## 2024-08-28 DIAGNOSIS — C88.0: Primary | ICD-10-CM

## 2024-08-28 LAB
ALBUMIN LEVEL: 4.5 G/DL (ref 3.5–5)
ALBUMIN/GLOB SERPL: 1.9 {RATIO} (ref 1.1–1.8)
ALP ISO SERPL-ACNC: 106 U/L (ref 38–126)
ALT SERPLBLD-CCNC: 46 U/L (ref 12–78)
ANION GAP SERPL CALC-SCNC: 11.7 MEQ/L (ref 5–15)
AST SERPL QL: 65 U/L (ref 17–59)
BILIRUBIN,TOTAL: 0.7 MG/DL (ref 0.2–1.3)
BUN SERPL-MCNC: 14 MG/DL (ref 9–20)
CALCIUM SPEC-MCNC: 8.7 MG/DL (ref 8.4–10.2)
CHLORIDE SPEC-SCNC: 106 MMOL/L (ref 98–107)
CO2 SERPL-SCNC: 22 MMOL/L (ref 22–30)
CREAT BLD-SCNC: 1.1 MG/DL (ref 0.66–1.25)
ESTIMATED GLOMERULAR FILT RATE: 68 ML/MIN (ref 60–?)
GFR (AFRICAN AMERICAN): 82 ML/MIN (ref 60–?)
GLOBULIN SER CALC-MCNC: 2.4 G/DL (ref 1.3–3.2)
GLUCOSE: 123 MG/DL (ref 74–100)
HCT VFR BLD CALC: 48 % (ref 42–52)
HGB BLD-MCNC: 14.9 G/DL (ref 14.1–18)
MCHC RBC-ENTMCNC: 31 G/DL (ref 31.8–35.4)
MCV RBC: 85.4 FL (ref 80–94)
MEAN CORPUSCULAR HEMOGLOBIN: 26.4 PG (ref 27–31.2)
PLATELET # BLD: 441 K/MM3 (ref 142–424)
POTASSIUM: 4.7 MMOL/L (ref 3.5–5.1)
PROT SERPL-MCNC: 6.9 G/DL (ref 6.3–8.2)
RBC # BLD AUTO: 5.62 M/MM3 (ref 4.6–6.2)
SODIUM SPEC-SCNC: 135 MMOL/L (ref 136–145)
URATE SERPL-SCNC: 4.1 MG/DL (ref 3.5–8.5)
WBC # BLD AUTO: 5.7 K/MM3 (ref 4.8–10.8)

## 2024-08-28 PROCEDURE — 80053 COMPREHEN METABOLIC PANEL: CPT

## 2024-08-28 PROCEDURE — 36415 COLL VENOUS BLD VENIPUNCTURE: CPT

## 2024-08-28 PROCEDURE — 83615 LACTATE (LD) (LDH) ENZYME: CPT

## 2024-08-28 PROCEDURE — 85025 COMPLETE CBC W/AUTO DIFF WBC: CPT

## 2024-08-28 PROCEDURE — 84550 ASSAY OF BLOOD/URIC ACID: CPT

## 2024-08-30 ENCOUNTER — TELEPHONE (OUTPATIENT)
Dept: ENDOCRINOLOGY | Facility: CLINIC | Age: 64
End: 2024-08-30
Payer: COMMERCIAL

## 2024-08-30 RX ORDER — INSULIN LISPRO 100 [IU]/ML
95 INJECTION, SUSPENSION SUBCUTANEOUS 2 TIMES DAILY WITH MEALS
Qty: 60 ML | Refills: 2 | Status: SHIPPED | OUTPATIENT
Start: 2024-08-30

## 2024-09-29 RX ORDER — EMPAGLIFLOZIN 25 MG/1
25 TABLET, FILM COATED ORAL DAILY
Qty: 90 TABLET | Refills: 3 | Status: SHIPPED | OUTPATIENT
Start: 2024-09-29

## 2024-10-10 ENCOUNTER — HOSPITAL ENCOUNTER (OUTPATIENT)
Age: 64
Discharge: HOME | End: 2024-10-10
Payer: COMMERCIAL

## 2024-10-10 DIAGNOSIS — C90.00: Primary | ICD-10-CM

## 2024-10-10 LAB
ALBUMIN LEVEL: 4.4 G/DL (ref 3.5–5)
ALBUMIN/GLOB SERPL: 2 {RATIO} (ref 1.1–1.8)
ALP ISO SERPL-ACNC: 92 U/L (ref 38–126)
ALT SERPLBLD-CCNC: 42 U/L (ref 12–78)
ANION GAP SERPL CALC-SCNC: 8.8 MEQ/L (ref 5–15)
AST SERPL QL: 49 U/L (ref 17–59)
BILIRUBIN,TOTAL: 0.7 MG/DL (ref 0.2–1.3)
BUN SERPL-MCNC: 17 MG/DL (ref 9–20)
CALCIUM SPEC-MCNC: 9.2 MG/DL (ref 8.4–10.2)
CHLORIDE SPEC-SCNC: 104 MMOL/L (ref 98–107)
CO2 SERPL-SCNC: 24 MMOL/L (ref 22–30)
CREAT BLD-SCNC: 1.3 MG/DL (ref 0.66–1.25)
ESTIMATED GLOMERULAR FILT RATE: 56 ML/MIN (ref 60–?)
GFR (AFRICAN AMERICAN): 67 ML/MIN (ref 60–?)
GLOBULIN SER CALC-MCNC: 2.2 G/DL (ref 1.3–3.2)
GLUCOSE: 114 MG/DL (ref 74–100)
HCT VFR BLD CALC: 51.6 % (ref 42–52)
HGB BLD-MCNC: 17.3 G/DL (ref 14.1–18)
MCHC RBC-ENTMCNC: 33.6 G/DL (ref 31.8–35.4)
MCV RBC: 87.2 FL (ref 80–94)
MEAN CORPUSCULAR HEMOGLOBIN: 29.3 PG (ref 27–31.2)
PLATELET # BLD: 398 K/MM3 (ref 142–424)
POTASSIUM: 4.8 MMOL/L (ref 3.5–5.1)
PROT SERPL-MCNC: 6.6 G/DL (ref 6.3–8.2)
RBC # BLD AUTO: 5.91 M/MM3 (ref 4.6–6.2)
SODIUM SPEC-SCNC: 132 MMOL/L (ref 136–145)
URATE SERPL-SCNC: 4.2 MG/DL (ref 3.5–8.5)
WBC # BLD AUTO: 7.3 K/MM3 (ref 4.8–10.8)

## 2024-10-10 PROCEDURE — 83615 LACTATE (LD) (LDH) ENZYME: CPT

## 2024-10-10 PROCEDURE — 85025 COMPLETE CBC W/AUTO DIFF WBC: CPT

## 2024-10-10 PROCEDURE — 80053 COMPREHEN METABOLIC PANEL: CPT

## 2024-10-10 PROCEDURE — 36415 COLL VENOUS BLD VENIPUNCTURE: CPT

## 2024-10-10 PROCEDURE — 84550 ASSAY OF BLOOD/URIC ACID: CPT

## 2024-10-26 ENCOUNTER — HOSPITAL ENCOUNTER (EMERGENCY)
Age: 64
Discharge: HOME | End: 2024-10-26
Payer: COMMERCIAL

## 2024-10-26 VITALS
DIASTOLIC BLOOD PRESSURE: 85 MMHG | HEART RATE: 131 BPM | OXYGEN SATURATION: 98 % | SYSTOLIC BLOOD PRESSURE: 153 MMHG | TEMPERATURE: 100.04 F | RESPIRATION RATE: 20 BRPM

## 2024-10-26 VITALS
SYSTOLIC BLOOD PRESSURE: 153 MMHG | RESPIRATION RATE: 20 BRPM | OXYGEN SATURATION: 98 % | TEMPERATURE: 101.8 F | HEART RATE: 131 BPM | DIASTOLIC BLOOD PRESSURE: 85 MMHG

## 2024-10-26 VITALS — BODY MASS INDEX: 48.2 KG/M2

## 2024-10-26 DIAGNOSIS — R50.9: ICD-10-CM

## 2024-10-26 DIAGNOSIS — M79.10: ICD-10-CM

## 2024-10-26 DIAGNOSIS — R51.9: ICD-10-CM

## 2024-10-26 DIAGNOSIS — J02.9: Primary | ICD-10-CM

## 2024-10-26 PROCEDURE — G0381 LEV 2 HOSP TYPE B ED VISIT: HCPCS

## 2024-10-26 PROCEDURE — 99212 OFFICE O/P EST SF 10 MIN: CPT

## 2024-10-26 PROCEDURE — 87635 SARS-COV-2 COVID-19 AMP PRB: CPT

## 2024-10-26 PROCEDURE — 87880 STREP A ASSAY W/OPTIC: CPT

## 2024-10-26 PROCEDURE — 87804 INFLUENZA ASSAY W/OPTIC: CPT

## 2025-01-09 ENCOUNTER — HOSPITAL ENCOUNTER (OUTPATIENT)
Dept: HOSPITAL 22 - SC.PAIN | Age: 65
Discharge: HOME | End: 2025-01-09
Payer: COMMERCIAL

## 2025-01-09 VITALS
RESPIRATION RATE: 18 BRPM | SYSTOLIC BLOOD PRESSURE: 137 MMHG | OXYGEN SATURATION: 95 % | HEART RATE: 95 BPM | DIASTOLIC BLOOD PRESSURE: 81 MMHG

## 2025-01-09 VITALS — BODY MASS INDEX: 47.5 KG/M2

## 2025-01-09 DIAGNOSIS — Z79.899: ICD-10-CM

## 2025-01-09 DIAGNOSIS — Z79.4 TYPE 2 DIABETES MELLITUS WITH HYPERGLYCEMIA, WITH LONG-TERM CURRENT USE OF INSULIN: Chronic | ICD-10-CM

## 2025-01-09 DIAGNOSIS — M46.1: Primary | ICD-10-CM

## 2025-01-09 DIAGNOSIS — E11.65 TYPE 2 DIABETES MELLITUS WITH HYPERGLYCEMIA, WITH LONG-TERM CURRENT USE OF INSULIN: Chronic | ICD-10-CM

## 2025-01-09 DIAGNOSIS — Z73.89: ICD-10-CM

## 2025-01-09 PROCEDURE — 99212 OFFICE O/P EST SF 10 MIN: CPT

## 2025-01-09 PROCEDURE — G0463 HOSPITAL OUTPT CLINIC VISIT: HCPCS

## 2025-01-09 RX ORDER — METFORMIN HYDROCHLORIDE 500 MG/1
1500 TABLET, EXTENDED RELEASE ORAL
Qty: 270 TABLET | Refills: 0 | Status: SHIPPED | OUTPATIENT
Start: 2025-01-09

## 2025-01-09 NOTE — TELEPHONE ENCOUNTER
Rx Refill Note  Requested Prescriptions     Pending Prescriptions Disp Refills    metFORMIN ER (GLUCOPHAGE-XR) 500 MG 24 hr tablet 270 tablet 0     Sig: Take 3 tablets by mouth Daily With Dinner.          Last office visit with prescribing clinician: 6/24/2024     Next office visit with prescribing clinician: 1/20/2025   }    Stanford Haynes MA  01/09/25, 09:10 EST

## 2025-01-21 ENCOUNTER — HOSPITAL ENCOUNTER (OUTPATIENT)
Age: 65
Discharge: HOME | End: 2025-01-21
Payer: COMMERCIAL

## 2025-01-21 VITALS
SYSTOLIC BLOOD PRESSURE: 124 MMHG | DIASTOLIC BLOOD PRESSURE: 69 MMHG | HEART RATE: 71 BPM | OXYGEN SATURATION: 97 % | RESPIRATION RATE: 17 BRPM

## 2025-01-21 VITALS
OXYGEN SATURATION: 94 % | SYSTOLIC BLOOD PRESSURE: 134 MMHG | HEART RATE: 87 BPM | RESPIRATION RATE: 18 BRPM | DIASTOLIC BLOOD PRESSURE: 77 MMHG

## 2025-01-21 VITALS
SYSTOLIC BLOOD PRESSURE: 120 MMHG | DIASTOLIC BLOOD PRESSURE: 70 MMHG | RESPIRATION RATE: 18 BRPM | HEART RATE: 70 BPM | OXYGEN SATURATION: 94 %

## 2025-01-21 VITALS
DIASTOLIC BLOOD PRESSURE: 77 MMHG | SYSTOLIC BLOOD PRESSURE: 134 MMHG | RESPIRATION RATE: 18 BRPM | HEART RATE: 87 BPM | OXYGEN SATURATION: 94 %

## 2025-01-21 VITALS — BODY MASS INDEX: 46.1 KG/M2

## 2025-01-21 DIAGNOSIS — M46.1: Primary | ICD-10-CM

## 2025-01-21 PROCEDURE — G0260 INJ FOR SACROILIAC JT ANESTH: HCPCS

## 2025-01-21 PROCEDURE — 27096 INJECT SACROILIAC JOINT: CPT

## 2025-01-24 ENCOUNTER — OFFICE VISIT (OUTPATIENT)
Dept: ENDOCRINOLOGY | Facility: CLINIC | Age: 65
End: 2025-01-24
Payer: COMMERCIAL

## 2025-01-24 VITALS
SYSTOLIC BLOOD PRESSURE: 122 MMHG | HEART RATE: 88 BPM | BODY MASS INDEX: 41.75 KG/M2 | WEIGHT: 315 LBS | HEIGHT: 73 IN | OXYGEN SATURATION: 95 % | DIASTOLIC BLOOD PRESSURE: 80 MMHG

## 2025-01-24 DIAGNOSIS — M79.642 PAIN IN BOTH HANDS: ICD-10-CM

## 2025-01-24 DIAGNOSIS — I10 PRIMARY HYPERTENSION: ICD-10-CM

## 2025-01-24 DIAGNOSIS — M79.641 PAIN IN BOTH HANDS: ICD-10-CM

## 2025-01-24 DIAGNOSIS — Z79.4 TYPE 2 DIABETES MELLITUS WITH HYPERGLYCEMIA, WITH LONG-TERM CURRENT USE OF INSULIN: Primary | ICD-10-CM

## 2025-01-24 DIAGNOSIS — E78.2 MIXED HYPERLIPIDEMIA: ICD-10-CM

## 2025-01-24 DIAGNOSIS — E11.65 TYPE 2 DIABETES MELLITUS WITH HYPERGLYCEMIA, WITH LONG-TERM CURRENT USE OF INSULIN: Primary | ICD-10-CM

## 2025-01-24 LAB
ALBUMIN UR-MCNC: <1.2 MG/DL
CREAT UR-MCNC: 55.3 MG/DL
MICROALBUMIN/CREAT UR: NORMAL MG/G{CREAT}

## 2025-01-24 PROCEDURE — 82570 ASSAY OF URINE CREATININE: CPT | Performed by: INTERNAL MEDICINE

## 2025-01-24 PROCEDURE — 82043 UR ALBUMIN QUANTITATIVE: CPT | Performed by: INTERNAL MEDICINE

## 2025-01-24 PROCEDURE — 82985 ASSAY OF GLYCATED PROTEIN: CPT | Performed by: INTERNAL MEDICINE

## 2025-01-24 RX ORDER — TIRZEPATIDE 12.5 MG/.5ML
12.5 INJECTION, SOLUTION SUBCUTANEOUS
Qty: 6 ML | Refills: 3 | Status: SHIPPED | OUTPATIENT
Start: 2025-01-24

## 2025-01-24 RX ORDER — VENETOCLAX 100 MG/1
TABLET, FILM COATED ORAL
COMMUNITY
Start: 2024-06-20

## 2025-01-24 RX ORDER — ALLOPURINOL 300 MG/1
300 TABLET ORAL DAILY
COMMUNITY
Start: 2024-07-02 | End: 2025-07-03

## 2025-01-24 NOTE — ASSESSMENT & PLAN NOTE
Check fructosamine.  Increase mounjaro.    DexCom G7 CGM was downloaded today.  Data was reviewed from 1/11/25 to 1/24/25.  This showed fairly stable glucose control but above goal.  Time in range was 73%.

## 2025-01-24 NOTE — PROGRESS NOTES
"     Office Note      Date: 2025  Patient Name: Surya Haynes  MRN: [id number]  : 1960    Chief Complaint   Patient presents with    Diabetes       History of Present Illness:   Surya Haynes is a 64 y.o. male who presents for Diabetes type 2. Diagnosed in: 2016. Treated in past with oral agents. Current treatments: premix insulin, mounjaro, jardiance and metformin. Number of insulin shots per day: 2. Checks blood sugar 288 times a day - on DexCom G7. Has low blood sugar: rare. Aspirin use: No - on xarelto. Statin use: Yes. ACE-I/ARB use: Yes. Changes in health since last visit: none. Last eye exam 2024     Subjective      Diabetic Complications:  Eyes: No  Kidneys: No  Feet: No  Heart: No    Diet and Exercise:  Meals per day: 3  Minutes of exercise per week: 0 mins.    Review of Systems:   Review of Systems   Constitutional: Negative.    Cardiovascular: Negative.    Gastrointestinal: Negative.    Endocrine: Negative.        The following portions of the patient's history were reviewed and updated as appropriate: allergies, current medications, past family history, past medical history, past social history, past surgical history, and problem list.    Objective     Visit Vitals  /80   Pulse 88   Ht 185.4 cm (73\")   Wt (!) 165 kg (364 lb)   SpO2 95%   BMI 48.02 kg/m²       Physical Exam:  Physical Exam  Constitutional:       Appearance: Normal appearance.   Neurological:      Mental Status: He is alert.         Labs:    HbA1c  No results found for: \"HGBA1C\"    CMP  Lab Results   Component Value Date    BUN 24 (H) 2022    CREATININE 1.29 2022    EGFRIFNONA 57 (L) 2022    EGFRIFAFRI >60 2022    BCR 19 2022    K 4.6 2022    CO2 20 (L) 2022    CALCIUM 9.8 2022    AST 36 2022    ALT 32 2022        Lipid Panel  Lab Results   Component Value Date    HDL Cholesterol 44 2023    LDL Cholesterol  66 2023    LDL/HDL Ratio 1.52 " "12/21/2023    Triglycerides 65 12/21/2023        TSH  Lab Results   Component Value Date    TSH 0.709 04/13/2023        Hemoglobin A1C  No components found for: \"HGBA1C\"     Microalbumin/Creatinine  Lab Results   Component Value Date    REECE  04/13/2023      Comment:      Unable to calculate    MICROALBUR <1.2 04/13/2023           Assessment / Plan      Assessment & Plan:  Diagnoses and all orders for this visit:    1. Type 2 diabetes mellitus with hyperglycemia, with long-term current use of insulin (Primary)  Assessment & Plan:  Check fructosamine.  Increase mounjaro.    DexCom G7 CGM was downloaded today.  Data was reviewed from 1/11/25 to 1/24/25.  This showed fairly stable glucose control but above goal.  Time in range was 73%.      Orders:  -     Cancel: POC Glucose, Blood  -     Cancel: POC Glycosylated Hemoglobin (Hb A1C)  -     Microalbumin / Creatinine Urine Ratio - Urine, Clean Catch; Future  -     Fructosamine; Future    2. Primary hypertension  Assessment & Plan:  Hypertension is stable and controlled.  Continue current treatment regimen.  Blood pressure will be reassessed in 6 months.      3. Mixed hyperlipidemia  Assessment & Plan:  Continue statin.  Plan to check lipids next visit.      4. Pain in both hands  Assessment & Plan:  He notes hand pain.  I don't think this is diabetes related.  We discussed referral to hand specialist.    Orders:  -     Ambulatory Referral to Orthopedic Surgery    Other orders  -     Tirzepatide (Mounjaro) 12.5 MG/0.5ML solution auto-injector; Inject 0.5 mL under the skin into the appropriate area as directed Every 7 (Seven) Days.  Dispense: 6 mL; Refill: 3      Current Outpatient Medications   Medication Instructions    Accu-Chek Softclix Lancets lancets 1 each, Other, Daily, use to test twice daily    allopurinol (ZYLOPRIM) 300 mg, Daily    chlorhexidine (PERIDEX) 0.12 % solution 2 Times Daily    Continuous Blood Gluc  (Dexcom G7 ) device 1 each, " Not Applicable, See Admin Instructions    Continuous Blood Gluc Sensor (Dexcom G7 Sensor) misc 1 each, Not Applicable, Every 10 Days    dofetilide (TIKOSYN) 250 mcg, 2 Times Daily    dofetilide (TIKOSYN) 125 mcg, 2 Times Daily    DULoxetine (CYMBALTA) 60 MG capsule Daily    furosemide (LASIX) 40 mg, 3 Times Weekly    Insulin Lispro Prot & Lispro (HumaLOG Mix 75/25 KwikPen) (75-25) 100 UNIT/ML suspension pen-injector pen 95 Units, Subcutaneous, 2 Times Daily With Meals    Insulin Pen Needle (BD Pen Needle Flavia 2nd Gen) 32G X 4 MM misc Use two Per day.  200 each R 3    Jardiance 25 mg, Oral, Daily    KLOR-CON 20 MEQ CR tablet 20 mEq, Daily    lidocaine-prilocaine (EMLA) 2.5-2.5 % cream     lisinopril (PRINIVIL,ZESTRIL) 10 mg, Daily    magnesium oxide (MAG-OX) 400 MG tablet 1 tablet, Daily    metFORMIN ER (GLUCOPHAGE-XR) 1,500 mg, Oral, Daily With Dinner    metoprolol succinate XL (TOPROL-XL) 50 mg, 2 Times Daily    Mounjaro 12.5 mg, Subcutaneous, Every 7 Days    Multiple Vitamins-Minerals (ZINC PO) Daily    omeprazole (PRILOSEC) 40 mg, Daily    OneTouch Verio test strip TEST FASTING BLOOD SUGAR TWICE A DAY    potassium chloride (KLOR-CON) 8 MEQ CR tablet 8 mEq    pregabalin (LYRICA) 75 mg, 2 Times Daily    rosuvastatin (CRESTOR) 10 mg, Oral, Daily    Venclexta 100 MG chemo tablet Take 4 tablets (400mg) by mouth daily for 21 days of a 28 day cycle.    VITAMIN D PO Daily    Xarelto 20 mg, Daily    zinc gluconate 50 mg      Return in about 6 months (around 7/24/2025) for Recheck with fructosamine, CMP, lipid, TSH, microalbumin, foot exam.    Electronically signed by: Jermaine Dunn MD  01/24/2025

## 2025-01-24 NOTE — ASSESSMENT & PLAN NOTE
He notes hand pain.  I don't think this is diabetes related.  We discussed referral to hand specialist.

## 2025-01-26 LAB — FRUCTOSAMINE SERPL-SCNC: 312 UMOL/L (ref 0–285)

## 2025-02-06 ENCOUNTER — HOSPITAL ENCOUNTER (OUTPATIENT)
Dept: HOSPITAL 22 - SC.PAIN | Age: 65
Discharge: HOME | End: 2025-02-06
Payer: COMMERCIAL

## 2025-02-06 VITALS
SYSTOLIC BLOOD PRESSURE: 130 MMHG | DIASTOLIC BLOOD PRESSURE: 67 MMHG | RESPIRATION RATE: 14 BRPM | OXYGEN SATURATION: 95 % | HEART RATE: 84 BPM

## 2025-02-06 VITALS — BODY MASS INDEX: 46.1 KG/M2

## 2025-02-06 DIAGNOSIS — Z79.899: ICD-10-CM

## 2025-02-06 DIAGNOSIS — M46.1: Primary | ICD-10-CM

## 2025-02-06 PROCEDURE — 99212 OFFICE O/P EST SF 10 MIN: CPT

## 2025-02-06 PROCEDURE — G0463 HOSPITAL OUTPT CLINIC VISIT: HCPCS

## 2025-03-20 ENCOUNTER — PRIOR AUTHORIZATION (OUTPATIENT)
Dept: ENDOCRINOLOGY | Facility: CLINIC | Age: 65
End: 2025-03-20
Payer: COMMERCIAL

## 2025-03-20 RX ORDER — ACYCLOVIR 400 MG/1
TABLET ORAL
Qty: 9 EACH | Refills: 3 | Status: SHIPPED | OUTPATIENT
Start: 2025-03-20

## 2025-03-20 NOTE — TELEPHONE ENCOUNTER
Rx Refill Note  Requested Prescriptions     Pending Prescriptions Disp Refills    Continuous Glucose Sensor (Dexcom G7 Sensor) misc [Pharmacy Med Name: DEXCOM G7 SENSOR] 9 each 3     Sig: USE AS DIRECTED . CHANGE EVERY 10 DAYS      Last office visit with prescribing clinician: 1/24/2025   Last telemedicine visit with prescribing clinician: Visit date not found   Next office visit with prescribing clinician: 8/12/2025                         Would you like a call back once the refill request has been completed: [] Yes [] No    If the office needs to give you a call back, can they leave a voicemail: [] Yes [] No    Marium Petit MA  03/20/25, 13:57 EDT

## 2025-03-20 NOTE — TELEPHONE ENCOUNTER
CHUCK DE LA FUENTE (Malcolm: [id number])  PA Case ID #: [id number]  Rx #: [id number]  Need Help? Call us at [phone]  Outcome  [facility]  Your PA request has been approved. Additional information will be provided in the approval communication. (Message 1142)  Effective Date: 3/20/2025  Authorization Expiration Date: 3/20/2026  Drug  Dexcom G7 Sensor  ePA cloud logo  Form  Mar De PA Form (2017 [employer])

## 2025-04-07 DIAGNOSIS — Z79.4 TYPE 2 DIABETES MELLITUS WITH HYPERGLYCEMIA, WITH LONG-TERM CURRENT USE OF INSULIN: Chronic | ICD-10-CM

## 2025-04-07 DIAGNOSIS — E11.65 TYPE 2 DIABETES MELLITUS WITH HYPERGLYCEMIA, WITH LONG-TERM CURRENT USE OF INSULIN: Chronic | ICD-10-CM

## 2025-04-07 RX ORDER — METFORMIN HYDROCHLORIDE 500 MG/1
1500 TABLET, EXTENDED RELEASE ORAL
Qty: 270 TABLET | Refills: 1 | Status: SHIPPED | OUTPATIENT
Start: 2025-04-07

## 2025-04-07 NOTE — TELEPHONE ENCOUNTER
Rx Refill Note  Requested Prescriptions     Pending Prescriptions Disp Refills    metFORMIN ER (GLUCOPHAGE-XR) 500 MG 24 hr tablet [Pharmacy Med Name: *METFORMIN 500MG ER] 270 tablet 0     Sig: TAKE 3 TABLETS BY MOUTH DAILY WITH DINNER.      Last office visit with prescribing clinician: Visit date not found     Next office visit with prescribing clinician: 8/12/2025  Courtney Fernando MA  04/07/25, 12:04 EDT

## 2025-04-10 ENCOUNTER — HOSPITAL ENCOUNTER (OUTPATIENT)
Dept: HOSPITAL 22 - SC.PAIN | Age: 65
Discharge: HOME | End: 2025-04-10
Payer: COMMERCIAL

## 2025-04-10 VITALS
DIASTOLIC BLOOD PRESSURE: 71 MMHG | SYSTOLIC BLOOD PRESSURE: 127 MMHG | RESPIRATION RATE: 18 BRPM | HEART RATE: 101 BPM | OXYGEN SATURATION: 94 %

## 2025-04-10 VITALS — BODY MASS INDEX: 46.8 KG/M2

## 2025-04-10 DIAGNOSIS — Z79.899: ICD-10-CM

## 2025-04-10 DIAGNOSIS — M46.1: Primary | ICD-10-CM

## 2025-04-10 PROCEDURE — 99212 OFFICE O/P EST SF 10 MIN: CPT

## 2025-04-10 PROCEDURE — G0463 HOSPITAL OUTPT CLINIC VISIT: HCPCS

## 2025-04-10 RX ORDER — PEN NEEDLE, DIABETIC 32GX 5/32"
NEEDLE, DISPOSABLE MISCELLANEOUS
Qty: 200 EACH | Refills: 3 | Status: SHIPPED | OUTPATIENT
Start: 2025-04-10

## 2025-04-10 RX ORDER — INSULIN LISPRO 100 [IU]/ML
95 INJECTION, SUSPENSION SUBCUTANEOUS 2 TIMES DAILY WITH MEALS
Qty: 60 ML | Refills: 2 | Status: SHIPPED | OUTPATIENT
Start: 2025-04-10 | End: 2025-04-11 | Stop reason: SDUPTHER

## 2025-04-10 NOTE — TELEPHONE ENCOUNTER
PT CALLED REQUESTING NOVOLOG MIX 70/30 PENS AND PEN NEEDLES Rxs TO BE SENT IN TO [facility] IN [place], [place].    [facility]

## 2025-04-11 ENCOUNTER — TELEPHONE (OUTPATIENT)
Dept: ENDOCRINOLOGY | Facility: CLINIC | Age: 65
End: 2025-04-11
Payer: COMMERCIAL

## 2025-04-11 RX ORDER — INSULIN ASPART 100 [IU]/ML
INJECTION, SUSPENSION SUBCUTANEOUS
Qty: 60 ML | Refills: 1 | Status: SHIPPED | OUTPATIENT
Start: 2025-04-11

## 2025-04-11 NOTE — TELEPHONE ENCOUNTER
PT CALLED STATING HUMALOG MIX IS NOT COVERED. HE REQUESTED WE SEND IN A DIFFERENT RX TO [facility] IN [place].     PT IS OUT OF MEDS.

## 2025-05-06 ENCOUNTER — HOSPITAL ENCOUNTER (OUTPATIENT)
Dept: HOSPITAL 22 - INF | Age: 65
Discharge: HOME | End: 2025-05-06
Payer: COMMERCIAL

## 2025-05-06 VITALS
DIASTOLIC BLOOD PRESSURE: 75 MMHG | HEART RATE: 92 BPM | SYSTOLIC BLOOD PRESSURE: 127 MMHG | RESPIRATION RATE: 18 BRPM | OXYGEN SATURATION: 95 %

## 2025-05-06 VITALS
SYSTOLIC BLOOD PRESSURE: 110 MMHG | HEART RATE: 89 BPM | OXYGEN SATURATION: 95 % | RESPIRATION RATE: 18 BRPM | DIASTOLIC BLOOD PRESSURE: 65 MMHG

## 2025-05-06 VITALS — BODY MASS INDEX: 35.2 KG/M2

## 2025-05-06 DIAGNOSIS — E11.8: Primary | ICD-10-CM

## 2025-05-06 DIAGNOSIS — L60.3: ICD-10-CM

## 2025-05-06 DIAGNOSIS — L57.0: ICD-10-CM

## 2025-05-06 LAB — HGB BLD-MCNC: 17.6 G/DL (ref 14.1–18)

## 2025-05-06 PROCEDURE — 99195 PHLEBOTOMY: CPT

## 2025-05-06 PROCEDURE — 85018 HEMOGLOBIN: CPT

## 2025-05-06 PROCEDURE — 85014 HEMATOCRIT: CPT

## 2025-05-06 PROCEDURE — 36415 COLL VENOUS BLD VENIPUNCTURE: CPT

## 2025-05-15 ENCOUNTER — TELEPHONE (OUTPATIENT)
Dept: ENDOCRINOLOGY | Facility: CLINIC | Age: 65
End: 2025-05-15

## 2025-05-15 NOTE — TELEPHONE ENCOUNTER
Hub staff attempted to follow warm transfer process and was unsuccessful     Caller: FABBY GREENE    Relationship to patient: Other    Best call back number: [phone]    Patient is needing: FABBY IS A  WITH [employer] AND HAS TRIED TO GET IN CONTACT WITH THE PT TO OFFER HIM FREE BENEFITS WITH THIS PLAN ASKED THAT WE RELAY THE MESSAGE TO THE PT AT THE NEXT APPT .

## 2025-06-13 RX ORDER — INSULIN ASPART 100 [IU]/ML
INJECTION, SUSPENSION SUBCUTANEOUS
Qty: 60 ML | Refills: 1 | Status: SHIPPED | OUTPATIENT
Start: 2025-06-13

## 2025-06-13 NOTE — TELEPHONE ENCOUNTER
Rx Refill Note  Requested Prescriptions     Pending Prescriptions Disp Refills    insulin aspart prot & aspart (NovoLOG Mix 70/30 FlexPen) (70-30) 100 UNIT/ML suspension pen-injector injection 60 mL 1     Sig: Inject 95 Units under the skin into the appropriate area as directed 2 (Two) Times a Day With Meals.      Last office visit with prescribing clinician: 1/24/2025   Last telemedicine visit with prescribing clinician: Visit date not found   Next office visit with prescribing clinician: 8/12/2025                         Would you like a call back once the refill request has been completed: [] Yes [] No    If the office needs to give you a call back, can they leave a voicemail: [] Yes [] No    Marium Petit MA  06/13/25, 11:28 EDT

## 2025-06-13 NOTE — TELEPHONE ENCOUNTER
PT CALLED STATING HIS PHARM IS CLOSED DUE TO AN EMERGENCY. HE REQUESTED WE SEND HIS INSULIN RX IN TO [facility] IN [place], [place].

## 2025-07-14 ENCOUNTER — HOSPITAL ENCOUNTER (OUTPATIENT)
Dept: HOSPITAL 22 - SC.PAIN | Age: 65
Discharge: HOME | End: 2025-07-14
Payer: COMMERCIAL

## 2025-07-14 VITALS
OXYGEN SATURATION: 95 % | SYSTOLIC BLOOD PRESSURE: 111 MMHG | RESPIRATION RATE: 18 BRPM | DIASTOLIC BLOOD PRESSURE: 68 MMHG | HEART RATE: 85 BPM

## 2025-07-14 DIAGNOSIS — Z79.899: ICD-10-CM

## 2025-07-14 DIAGNOSIS — M46.1: Primary | ICD-10-CM

## 2025-07-14 PROCEDURE — 99212 OFFICE O/P EST SF 10 MIN: CPT

## 2025-07-14 PROCEDURE — G0463 HOSPITAL OUTPT CLINIC VISIT: HCPCS

## 2025-08-13 RX ORDER — INSULIN ASPART 100 [IU]/ML
INJECTION, SUSPENSION SUBCUTANEOUS
Qty: 180 ML | Refills: 3 | Status: SHIPPED | OUTPATIENT
Start: 2025-08-13

## 2025-08-14 RX ORDER — INSULIN ASPART 100 [IU]/ML
INJECTION, SUSPENSION SUBCUTANEOUS
Refills: 6 | OUTPATIENT
Start: 2025-08-14